# Patient Record
Sex: MALE | Race: WHITE | URBAN - METROPOLITAN AREA
[De-identification: names, ages, dates, MRNs, and addresses within clinical notes are randomized per-mention and may not be internally consistent; named-entity substitution may affect disease eponyms.]

---

## 2017-02-22 ENCOUNTER — HISTORICAL (OUTPATIENT)
Dept: ADMINISTRATIVE | Facility: HOSPITAL | Age: 77
End: 2017-02-22

## 2019-03-19 ENCOUNTER — HISTORICAL (OUTPATIENT)
Dept: RADIOLOGY | Facility: HOSPITAL | Age: 79
End: 2019-03-19

## 2021-10-05 ENCOUNTER — HISTORICAL (OUTPATIENT)
Dept: ADMINISTRATIVE | Facility: HOSPITAL | Age: 81
End: 2021-10-05

## 2021-10-05 LAB
ABS NEUT (OLG): 6.94 X10(3)/MCL (ref 2.1–9.2)
ALBUMIN SERPL-MCNC: 3.2 GM/DL (ref 3.4–4.8)
ALBUMIN/GLOB SERPL: 1.2 RATIO (ref 1.1–2)
ALP SERPL-CCNC: 81 UNIT/L (ref 40–150)
ALT SERPL-CCNC: 23 UNIT/L (ref 0–55)
AST SERPL-CCNC: 32 UNIT/L (ref 5–34)
BASOPHILS # BLD AUTO: 0.1 X10(3)/MCL (ref 0–0.2)
BASOPHILS NFR BLD AUTO: 1 %
BILIRUB SERPL-MCNC: 0.7 MG/DL
BILIRUBIN DIRECT+TOT PNL SERPL-MCNC: 0.2 MG/DL (ref 0–0.5)
BILIRUBIN DIRECT+TOT PNL SERPL-MCNC: 0.5 MG/DL (ref 0–0.8)
BUN SERPL-MCNC: 12.1 MG/DL (ref 8.4–25.7)
CALCIUM SERPL-MCNC: 9 MG/DL (ref 8.8–10)
CHLORIDE SERPL-SCNC: 103 MMOL/L (ref 98–107)
CHOLEST SERPL-MCNC: 184 MG/DL
CHOLEST/HDLC SERPL: 5 {RATIO} (ref 0–5)
CO2 SERPL-SCNC: 24 MMOL/L (ref 23–31)
CREAT SERPL-MCNC: 0.82 MG/DL (ref 0.73–1.18)
DIGOXIN SERPL-MCNC: 0.7 NG/ML (ref 0.8–2)
EOSINOPHIL # BLD AUTO: 0.1 X10(3)/MCL (ref 0–0.9)
EOSINOPHIL NFR BLD AUTO: 1 %
ERYTHROCYTE [DISTWIDTH] IN BLOOD BY AUTOMATED COUNT: 14.3 % (ref 11.5–17)
EST. AVERAGE GLUCOSE BLD GHB EST-MCNC: 99.7 MG/DL
GLOBULIN SER-MCNC: 2.7 GM/DL (ref 2.4–3.5)
GLUCOSE SERPL-MCNC: 98 MG/DL (ref 82–115)
HBA1C MFR BLD: 5.1 %
HCT VFR BLD AUTO: 39.8 % (ref 42–52)
HDLC SERPL-MCNC: 40 MG/DL (ref 35–60)
HGB BLD-MCNC: 13 GM/DL (ref 14–18)
LDLC SERPL CALC-MCNC: 126 MG/DL (ref 50–140)
LYMPHOCYTES # BLD AUTO: 1.4 X10(3)/MCL (ref 0.6–4.6)
LYMPHOCYTES NFR BLD AUTO: 15 %
MCH RBC QN AUTO: 31.7 PG (ref 27–31)
MCHC RBC AUTO-ENTMCNC: 32.7 GM/DL (ref 33–36)
MCV RBC AUTO: 97.1 FL (ref 80–94)
MONOCYTES # BLD AUTO: 1.1 X10(3)/MCL (ref 0.1–1.3)
MONOCYTES NFR BLD AUTO: 11 %
NEUTROPHILS # BLD AUTO: 6.94 X10(3)/MCL (ref 2.1–9.2)
NEUTROPHILS NFR BLD AUTO: 72 %
PLATELET # BLD AUTO: 354 X10(3)/MCL (ref 130–400)
PMV BLD AUTO: 10.2 FL (ref 9.4–12.4)
POTASSIUM SERPL-SCNC: 4.7 MMOL/L (ref 3.5–5.1)
PROT SERPL-MCNC: 5.9 GM/DL (ref 5.8–7.6)
PSA SERPL-MCNC: 6.45 NG/ML
RBC # BLD AUTO: 4.1 X10(6)/MCL (ref 4.7–6.1)
SODIUM SERPL-SCNC: 139 MMOL/L (ref 136–145)
TRIGL SERPL-MCNC: 89 MG/DL (ref 34–140)
TSH SERPL-ACNC: 3 UIU/ML (ref 0.35–4.94)
VLDLC SERPL CALC-MCNC: 18 MG/DL
WBC # SPEC AUTO: 9.7 X10(3)/MCL (ref 4.5–11.5)

## 2021-10-06 ENCOUNTER — HISTORICAL (OUTPATIENT)
Dept: ADMINISTRATIVE | Facility: HOSPITAL | Age: 81
End: 2021-10-06

## 2021-10-06 LAB — PREALB SERPL-MCNC: 14.9 MG/DL (ref 16–42)

## 2021-11-03 ENCOUNTER — HISTORICAL (OUTPATIENT)
Dept: ADMINISTRATIVE | Facility: HOSPITAL | Age: 81
End: 2021-11-03

## 2021-11-03 LAB
BUN SERPL-MCNC: 16.6 MG/DL (ref 8.4–25.7)
CALCIUM SERPL-MCNC: 8.4 MG/DL (ref 8.7–10.5)
CHLORIDE SERPL-SCNC: 109 MMOL/L (ref 98–107)
CO2 SERPL-SCNC: 28 MMOL/L (ref 23–31)
CREAT SERPL-MCNC: 0.78 MG/DL (ref 0.73–1.18)
CREAT/UREA NIT SERPL: 21
ERYTHROCYTE [DISTWIDTH] IN BLOOD BY AUTOMATED COUNT: 13.6 % (ref 11.5–17)
GLUCOSE SERPL-MCNC: 73 MG/DL (ref 82–115)
HCT VFR BLD AUTO: 34.8 % (ref 42–52)
HGB BLD-MCNC: 11.4 GM/DL (ref 14–18)
MCH RBC QN AUTO: 31 PG (ref 27–31)
MCHC RBC AUTO-ENTMCNC: 32.8 GM/DL (ref 33–36)
MCV RBC AUTO: 94.6 FL (ref 80–94)
PLATELET # BLD AUTO: 263 X10(3)/MCL (ref 130–400)
PMV BLD AUTO: 10.1 FL (ref 9.4–12.4)
POTASSIUM SERPL-SCNC: 4.5 MMOL/L (ref 3.5–5.1)
PREALB SERPL-MCNC: 13.3 MG/DL (ref 16–42)
RBC # BLD AUTO: 3.68 X10(6)/MCL (ref 4.7–6.1)
SODIUM SERPL-SCNC: 143 MMOL/L (ref 136–145)
WBC # SPEC AUTO: 7.3 X10(3)/MCL (ref 4.5–11.5)

## 2021-11-10 ENCOUNTER — HISTORICAL (OUTPATIENT)
Dept: CARDIOLOGY | Facility: HOSPITAL | Age: 81
End: 2021-11-10

## 2021-12-01 ENCOUNTER — HISTORICAL (OUTPATIENT)
Dept: ADMINISTRATIVE | Facility: HOSPITAL | Age: 81
End: 2021-12-01

## 2021-12-01 LAB — PREALB SERPL-MCNC: 12.3 MG/DL (ref 16–42)

## 2021-12-07 ENCOUNTER — HISTORICAL (OUTPATIENT)
Dept: ADMINISTRATIVE | Facility: HOSPITAL | Age: 81
End: 2021-12-07

## 2021-12-07 LAB
ERYTHROCYTE [DISTWIDTH] IN BLOOD BY AUTOMATED COUNT: 13.2 % (ref 11.5–17)
HCT VFR BLD AUTO: 37.9 % (ref 42–52)
HGB BLD-MCNC: 12.1 GM/DL (ref 14–18)
MCH RBC QN AUTO: 30.5 PG (ref 27–31)
MCHC RBC AUTO-ENTMCNC: 31.9 GM/DL (ref 33–36)
MCV RBC AUTO: 95.5 FL (ref 80–94)
PLATELET # BLD AUTO: 297 X10(3)/MCL (ref 130–400)
PMV BLD AUTO: 10.5 FL (ref 9.4–12.4)
RBC # BLD AUTO: 3.97 X10(6)/MCL (ref 4.7–6.1)
WBC # SPEC AUTO: 9.3 X10(3)/MCL (ref 4.5–11.5)

## 2021-12-28 ENCOUNTER — HISTORICAL (OUTPATIENT)
Dept: ADMINISTRATIVE | Facility: HOSPITAL | Age: 81
End: 2021-12-28

## 2021-12-28 LAB — DIGOXIN SERPL-MCNC: 0.4 NG/ML (ref 0.8–2)

## 2021-12-29 ENCOUNTER — HISTORICAL (OUTPATIENT)
Dept: ADMINISTRATIVE | Facility: HOSPITAL | Age: 81
End: 2021-12-29

## 2021-12-29 LAB — PREALB SERPL-MCNC: 14.1 MG/DL (ref 16–42)

## 2022-01-26 ENCOUNTER — HISTORICAL (OUTPATIENT)
Dept: ADMINISTRATIVE | Facility: HOSPITAL | Age: 82
End: 2022-01-26

## 2022-01-26 LAB — PREALB SERPL-MCNC: 17 MG/DL (ref 16–42)

## 2022-02-23 ENCOUNTER — HISTORICAL (OUTPATIENT)
Dept: ADMINISTRATIVE | Facility: HOSPITAL | Age: 82
End: 2022-02-23

## 2022-02-23 LAB — PREALB SERPL-MCNC: 19 (ref 16–42)

## 2022-03-07 ENCOUNTER — HISTORICAL (OUTPATIENT)
Dept: ADMINISTRATIVE | Facility: HOSPITAL | Age: 82
End: 2022-03-07

## 2022-03-07 LAB — PREALB SERPL-MCNC: 18.5 (ref 16–42)

## 2022-03-22 ENCOUNTER — HISTORICAL (OUTPATIENT)
Dept: ADMINISTRATIVE | Facility: HOSPITAL | Age: 82
End: 2022-03-22

## 2022-03-22 LAB — DIGOXIN SERPL-MCNC: 0.4 NG/ML (ref 0.8–2)

## 2022-04-04 ENCOUNTER — HISTORICAL (OUTPATIENT)
Dept: ADMINISTRATIVE | Facility: HOSPITAL | Age: 82
End: 2022-04-04

## 2022-04-04 LAB — PREALB SERPL-MCNC: 18.7 (ref 16–42)

## 2022-04-05 ENCOUNTER — HISTORICAL (OUTPATIENT)
Dept: ADMINISTRATIVE | Facility: HOSPITAL | Age: 82
End: 2022-04-05

## 2022-04-05 LAB
ABS NEUT (OLG): 6.86 (ref 2.1–9.2)
ALBUMIN SERPL-MCNC: 3.3 G/DL (ref 3.4–4.8)
ALBUMIN/GLOB SERPL: 1.3 {RATIO} (ref 1.1–2)
ALP SERPL-CCNC: 71 U/L (ref 40–150)
ALT SERPL-CCNC: 11 U/L (ref 0–55)
AST SERPL-CCNC: 13 U/L (ref 5–34)
BASOPHILS # BLD AUTO: 0 10*3/UL (ref 0–0.2)
BASOPHILS NFR BLD AUTO: 0 %
BILIRUB SERPL-MCNC: 0.6 MG/DL
BILIRUBIN DIRECT+TOT PNL SERPL-MCNC: 0.2 (ref 0–0.5)
BILIRUBIN DIRECT+TOT PNL SERPL-MCNC: 0.4 (ref 0–0.8)
BUN SERPL-MCNC: 17.9 MG/DL (ref 8.4–25.7)
CALCIUM SERPL-MCNC: 9.2 MG/DL (ref 8.7–10.5)
CHLORIDE SERPL-SCNC: 105 MMOL/L (ref 98–107)
CHOLEST SERPL-MCNC: 185 MG/DL
CHOLEST/HDLC SERPL: 6 {RATIO} (ref 0–5)
CO2 SERPL-SCNC: 29 MMOL/L (ref 23–31)
CREAT SERPL-MCNC: 0.91 MG/DL (ref 0.73–1.18)
EOSINOPHIL # BLD AUTO: 0.2 10*3/UL (ref 0–0.9)
EOSINOPHIL NFR BLD AUTO: 2 %
ERYTHROCYTE [DISTWIDTH] IN BLOOD BY AUTOMATED COUNT: 14.2 % (ref 11.5–17)
GLOBULIN SER-MCNC: 2.5 G/DL (ref 2.4–3.5)
GLUCOSE SERPL-MCNC: 82 MG/DL (ref 82–115)
HCT VFR BLD AUTO: 40 % (ref 42–52)
HDLC SERPL-MCNC: 32 MG/DL (ref 35–60)
HEMOLYSIS INTERF INDEX SERPL-ACNC: 3
HGB BLD-MCNC: 13.4 G/DL (ref 14–18)
ICTERIC INTERF INDEX SERPL-ACNC: 1
LDLC SERPL CALC-MCNC: 138 MG/DL (ref 50–140)
LIPEMIC INTERF INDEX SERPL-ACNC: 3
LYMPHOCYTES # BLD AUTO: 1.6 10*3/UL (ref 0.6–4.6)
LYMPHOCYTES NFR BLD AUTO: 17 %
MANUAL DIFF? (OHS): NO
MCH RBC QN AUTO: 31.3 PG (ref 27–31)
MCHC RBC AUTO-ENTMCNC: 33.5 G/DL (ref 33–36)
MCV RBC AUTO: 93.5 FL (ref 80–94)
MONOCYTES # BLD AUTO: 0.8 10*3/UL (ref 0.1–1.3)
MONOCYTES NFR BLD AUTO: 8 %
NEUTROPHILS # BLD AUTO: 6.86 10*3/UL (ref 2.1–9.2)
NEUTROPHILS NFR BLD AUTO: 72 %
PLATELET # BLD AUTO: 268 10*3/UL (ref 130–400)
PMV BLD AUTO: 10.5 FL (ref 9.4–12.4)
POTASSIUM SERPL-SCNC: 4.9 MMOL/L (ref 3.5–5.1)
PROT SERPL-MCNC: 5.8 G/DL (ref 5.8–7.6)
RBC # BLD AUTO: 4.28 10*6/UL (ref 4.7–6.1)
SODIUM SERPL-SCNC: 142 MMOL/L (ref 136–145)
TRIGL SERPL-MCNC: 77 MG/DL (ref 34–140)
VLDLC SERPL CALC-MCNC: 15 MG/DL
WBC # SPEC AUTO: 9.5 10*3/UL (ref 4.5–11.5)

## 2022-04-30 NOTE — OP NOTE
DATE OF SURGERY:    11/10/2021    SURGEON:  Geronimo Navarrete MD    PREOPERATIVE DIAGNOSIS:  Peripheral arterial disease with left foot wound.    POSTOPERATIVE DIAGNOSIS:  Peripheral arterial disease with left foot wound.    PROCEDURE PERFORMED:    1. Ultrasound guided access of right common femoral artery.  2. Aortogram with left lower extremity runoff.  3. Laser atherectomy and balloon angioplasty of left anterior tibial artery.    4. Laser atherectomy and balloon angioplasty of left peroneal artery.    5. Approximately 1 hour of monitored moderate conscious sedation.    ANESTHESIA:  Local with 1% lidocaine.    ESTIMATED BLOOD LOSS:  5 cc.    COMPLICATIONS:  None.    INDICATIONS FOR PROCEDURE:  The patient is an 81-year-old male who is a nursing home resident and poorly functional.  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  Operation indicated to optimize perfusion of possible.    PROCEDURE IN DETAIL:  After informed consent was obtained, patient was taken to the operating room, placed in supine position.  He was prepped and draped in a sterile fashion.  We used ultrasound to identify the common femoral artery on the patient's right hand side and these images were saved.  We accessed this with an 18 gauge needle followed by a wire via Seldinger technique and ultimately placed a 5-Danish sheath in the right groin and Omniflush catheter in the aorta.  We performed aortogram which revealed a widely patent aorta and widely patent bilateral iliac system.  We advanced the catheter to the distal external iliac artery on the left lower extremity and performed left lower extremity runoff.  The patient's common femoral artery and profunda femoral artery were widely patent with mild disease.  His superficial femoral artery was patent throughout its length as was his popliteal with scattered disease throughout and no flow-limiting stenosis.  The patient's  posterior tibial artery was occluded throughout its length and his runoff to the ankle was via a severely diseased peroneal and anterior tibial artery throughout their entire length.  We exchanged for a #5 Syriac up and over sheath and the patient was heparinized appropriately.  We were able to cross both the anterior tibial and peroneal artery lesions with a wire and prove true lumen with contrast injection.  We performed laser atherectomy through both of these vessels followed balloon angioplasty and had improvement of the stenotic lesions throughout with improved flow down to the level of the ankle.  There are no options in this patient for open revascularization and hopefully his improved perfusion today will help heal his current wounds and avoid limb loss.  At this point, the sheath was removed and pressure was held.  The patient tolerated the procedure well without any acute events or complications and was transferred in stable condition to recovery room.        ______________________________  MD ELIZABETH Mills III/UK  DD:  11/10/2021  Time:  11:23AM  DT:  11/10/2021  Time:  11:40AM  Job #:  [id number]

## 2022-09-06 ENCOUNTER — LAB REQUISITION (OUTPATIENT)
Dept: LAB | Facility: HOSPITAL | Age: 82
End: 2022-09-06
Payer: COMMERCIAL

## 2022-09-06 DIAGNOSIS — I48.91 UNSPECIFIED ATRIAL FIBRILLATION: ICD-10-CM

## 2022-09-06 LAB — DIGOXIN SERPL-MCNC: 0.4 NG/ML (ref 0.8–2)

## 2022-09-06 PROCEDURE — 80162 ASSAY OF DIGOXIN TOTAL: CPT | Performed by: THORACIC SURGERY (CARDIOTHORACIC VASCULAR SURGERY)

## 2022-09-27 ENCOUNTER — LAB REQUISITION (OUTPATIENT)
Dept: LAB | Facility: HOSPITAL | Age: 82
End: 2022-09-27
Payer: MEDICARE

## 2022-09-27 DIAGNOSIS — R97.20 ELEVATED PROSTATE SPECIFIC ANTIGEN (PSA): ICD-10-CM

## 2022-09-27 DIAGNOSIS — R94.6 ABNORMAL RESULTS OF THYROID FUNCTION STUDIES: ICD-10-CM

## 2022-09-27 DIAGNOSIS — R73.09 OTHER ABNORMAL GLUCOSE: ICD-10-CM

## 2022-09-27 LAB
EST. AVERAGE GLUCOSE BLD GHB EST-MCNC: 102.5 MG/DL
HBA1C MFR BLD: 5.2 %
PSA SERPL-MCNC: 8.26 NG/ML
TSH SERPL-ACNC: 2.5 UIU/ML (ref 0.35–4.94)

## 2022-09-27 PROCEDURE — 83036 HEMOGLOBIN GLYCOSYLATED A1C: CPT | Performed by: THORACIC SURGERY (CARDIOTHORACIC VASCULAR SURGERY)

## 2022-09-27 PROCEDURE — 84443 ASSAY THYROID STIM HORMONE: CPT | Performed by: THORACIC SURGERY (CARDIOTHORACIC VASCULAR SURGERY)

## 2022-09-27 PROCEDURE — 84153 ASSAY OF PSA TOTAL: CPT | Performed by: THORACIC SURGERY (CARDIOTHORACIC VASCULAR SURGERY)

## 2022-11-29 ENCOUNTER — LAB REQUISITION (OUTPATIENT)
Dept: LAB | Facility: HOSPITAL | Age: 82
End: 2022-11-29
Payer: MEDICARE

## 2022-11-29 DIAGNOSIS — I48.91 UNSPECIFIED ATRIAL FIBRILLATION: ICD-10-CM

## 2022-11-29 LAB — DIGOXIN SERPL-MCNC: 0.5 NG/ML (ref 0.8–2)

## 2022-11-29 PROCEDURE — 80162 ASSAY OF DIGOXIN TOTAL: CPT | Performed by: THORACIC SURGERY (CARDIOTHORACIC VASCULAR SURGERY)

## 2022-12-06 ENCOUNTER — LAB REQUISITION (OUTPATIENT)
Dept: LAB | Facility: HOSPITAL | Age: 82
End: 2022-12-06
Payer: MEDICARE

## 2022-12-06 DIAGNOSIS — R05.9 COUGH, UNSPECIFIED: ICD-10-CM

## 2022-12-06 LAB
ALBUMIN SERPL-MCNC: 2.8 GM/DL (ref 3.4–4.8)
ALBUMIN/GLOB SERPL: 1.3 RATIO (ref 1.1–2)
ALP SERPL-CCNC: 51 UNIT/L (ref 40–150)
ALT SERPL-CCNC: 19 UNIT/L (ref 0–55)
AST SERPL-CCNC: 25 UNIT/L (ref 5–34)
BASOPHILS # BLD AUTO: 0.01 X10(3)/MCL (ref 0–0.2)
BASOPHILS NFR BLD AUTO: 0.2 %
BILIRUBIN DIRECT+TOT PNL SERPL-MCNC: 0.7 MG/DL
BUN SERPL-MCNC: 15 MG/DL (ref 8.4–25.7)
CALCIUM SERPL-MCNC: 8.1 MG/DL (ref 8.8–10)
CHLORIDE SERPL-SCNC: 109 MMOL/L (ref 98–107)
CO2 SERPL-SCNC: 25 MMOL/L (ref 23–31)
CREAT SERPL-MCNC: 0.8 MG/DL (ref 0.73–1.18)
EOSINOPHIL # BLD AUTO: 0.14 X10(3)/MCL (ref 0–0.9)
EOSINOPHIL NFR BLD AUTO: 2.8 %
ERYTHROCYTE [DISTWIDTH] IN BLOOD BY AUTOMATED COUNT: 13.2 % (ref 11.5–17)
GFR SERPLBLD CREATININE-BSD FMLA CKD-EPI: >60 MLS/MIN/1.73/M2
GLOBULIN SER-MCNC: 2.2 GM/DL (ref 2.4–3.5)
GLUCOSE SERPL-MCNC: 74 MG/DL (ref 82–115)
HCT VFR BLD AUTO: 38.6 % (ref 42–52)
HGB BLD-MCNC: 13 GM/DL (ref 14–18)
IMM GRANULOCYTES # BLD AUTO: 0.02 X10(3)/MCL (ref 0–0.04)
IMM GRANULOCYTES NFR BLD AUTO: 0.4 %
LYMPHOCYTES # BLD AUTO: 1.02 X10(3)/MCL (ref 0.6–4.6)
LYMPHOCYTES NFR BLD AUTO: 20.3 %
MCH RBC QN AUTO: 31.9 PG (ref 27–31)
MCHC RBC AUTO-ENTMCNC: 33.7 MG/DL (ref 33–36)
MCV RBC AUTO: 94.6 FL (ref 80–94)
MONOCYTES # BLD AUTO: 0.62 X10(3)/MCL (ref 0.1–1.3)
MONOCYTES NFR BLD AUTO: 12.4 %
NEUTROPHILS # BLD AUTO: 3.2 X10(3)/MCL (ref 2.1–9.2)
NEUTROPHILS NFR BLD AUTO: 63.9 %
NRBC BLD AUTO-RTO: 0 %
PLATELET # BLD AUTO: 165 X10(3)/MCL (ref 130–400)
PMV BLD AUTO: 11.1 FL (ref 7.4–10.4)
POTASSIUM SERPL-SCNC: 4.4 MMOL/L (ref 3.5–5.1)
PROT SERPL-MCNC: 5 GM/DL (ref 5.8–7.6)
RBC # BLD AUTO: 4.08 X10(6)/MCL (ref 4.7–6.1)
SODIUM SERPL-SCNC: 143 MMOL/L (ref 136–145)
WBC # SPEC AUTO: 5 X10(3)/MCL (ref 4.5–11.5)

## 2022-12-06 PROCEDURE — 80053 COMPREHEN METABOLIC PANEL: CPT | Performed by: THORACIC SURGERY (CARDIOTHORACIC VASCULAR SURGERY)

## 2022-12-06 PROCEDURE — 85025 COMPLETE CBC W/AUTO DIFF WBC: CPT | Performed by: THORACIC SURGERY (CARDIOTHORACIC VASCULAR SURGERY)

## 2022-12-29 ENCOUNTER — LAB REQUISITION (OUTPATIENT)
Dept: LAB | Facility: HOSPITAL | Age: 82
End: 2022-12-29
Payer: MEDICARE

## 2022-12-29 DIAGNOSIS — R27.8 OTHER LACK OF COORDINATION: ICD-10-CM

## 2022-12-29 LAB — PSA SERPL-MCNC: 9.81 NG/ML

## 2022-12-29 PROCEDURE — 84153 ASSAY OF PSA TOTAL: CPT | Performed by: THORACIC SURGERY (CARDIOTHORACIC VASCULAR SURGERY)

## 2023-04-04 ENCOUNTER — LAB REQUISITION (OUTPATIENT)
Dept: LAB | Facility: HOSPITAL | Age: 83
End: 2023-04-04
Payer: MEDICARE

## 2023-04-04 DIAGNOSIS — R79.89 OTHER SPECIFIED ABNORMAL FINDINGS OF BLOOD CHEMISTRY: ICD-10-CM

## 2023-04-04 DIAGNOSIS — R68.89 OTHER GENERAL SYMPTOMS AND SIGNS: ICD-10-CM

## 2023-04-04 DIAGNOSIS — Z13.220 ENCOUNTER FOR SCREENING FOR LIPOID DISORDERS: ICD-10-CM

## 2023-04-04 LAB
ALBUMIN SERPL-MCNC: 3.1 G/DL (ref 3.4–4.8)
ALBUMIN/GLOB SERPL: 0.8 RATIO (ref 1.1–2)
ALP SERPL-CCNC: 62 UNIT/L (ref 40–150)
ALT SERPL-CCNC: 11 UNIT/L (ref 0–55)
AST SERPL-CCNC: 13 UNIT/L (ref 5–34)
BASOPHILS # BLD AUTO: 0.01 X10(3)/MCL (ref 0–0.2)
BASOPHILS NFR BLD AUTO: 0.4 %
BILIRUBIN DIRECT+TOT PNL SERPL-MCNC: 0.1 MG/DL
BUN SERPL-MCNC: 27.9 MG/DL (ref 8.4–25.7)
CALCIUM SERPL-MCNC: 9.3 MG/DL (ref 8.8–10)
CHLORIDE SERPL-SCNC: 109 MMOL/L (ref 98–107)
CHOLEST SERPL-MCNC: 201 MG/DL
CHOLEST/HDLC SERPL: 3 {RATIO} (ref 0–5)
CO2 SERPL-SCNC: 22 MMOL/L (ref 23–31)
CREAT SERPL-MCNC: 1.12 MG/DL (ref 0.73–1.18)
EOSINOPHIL # BLD AUTO: 0.05 X10(3)/MCL (ref 0–0.9)
EOSINOPHIL NFR BLD AUTO: 1.9 %
ERYTHROCYTE [DISTWIDTH] IN BLOOD BY AUTOMATED COUNT: 15.5 % (ref 11.5–17)
GFR SERPLBLD CREATININE-BSD FMLA CKD-EPI: >60 MLS/MIN/1.73/M2
GLOBULIN SER-MCNC: 3.8 GM/DL (ref 2.4–3.5)
GLUCOSE SERPL-MCNC: 81 MG/DL (ref 82–115)
HCT VFR BLD AUTO: 23.9 % (ref 42–52)
HDLC SERPL-MCNC: 58 MG/DL (ref 35–60)
HGB BLD-MCNC: 7.9 G/DL (ref 14–18)
IMM GRANULOCYTES # BLD AUTO: 0.01 X10(3)/MCL (ref 0–0.04)
IMM GRANULOCYTES NFR BLD AUTO: 0.4 %
LDLC SERPL CALC-MCNC: 129 MG/DL (ref 50–140)
LYMPHOCYTES # BLD AUTO: 1.22 X10(3)/MCL (ref 0.6–4.6)
LYMPHOCYTES NFR BLD AUTO: 45.4 %
MCH RBC QN AUTO: 27.1 PG (ref 27–31)
MCHC RBC AUTO-ENTMCNC: 33.1 G/DL (ref 33–36)
MCV RBC AUTO: 82.1 FL (ref 80–94)
MONOCYTES # BLD AUTO: 0.21 X10(3)/MCL (ref 0.1–1.3)
MONOCYTES NFR BLD AUTO: 7.8 %
NEUTROPHILS # BLD AUTO: 1.19 X10(3)/MCL (ref 2.1–9.2)
NEUTROPHILS NFR BLD AUTO: 44.1 %
NRBC BLD AUTO-RTO: 0 %
PLATELET # BLD AUTO: 151 X10(3)/MCL (ref 130–400)
PMV BLD AUTO: 10.7 FL (ref 7.4–10.4)
POTASSIUM SERPL-SCNC: 4.3 MMOL/L (ref 3.5–5.1)
PROT SERPL-MCNC: 6.9 GM/DL (ref 5.8–7.6)
RBC # BLD AUTO: 2.91 X10(6)/MCL (ref 4.7–6.1)
SODIUM SERPL-SCNC: 138 MMOL/L (ref 136–145)
TRIGL SERPL-MCNC: 68 MG/DL (ref 34–140)
VLDLC SERPL CALC-MCNC: 14 MG/DL
WBC # SPEC AUTO: 2.7 X10(3)/MCL (ref 4.5–11.5)

## 2023-04-04 PROCEDURE — 80053 COMPREHEN METABOLIC PANEL: CPT | Performed by: FAMILY MEDICINE

## 2023-04-04 PROCEDURE — 85025 COMPLETE CBC W/AUTO DIFF WBC: CPT | Performed by: FAMILY MEDICINE

## 2023-04-04 PROCEDURE — 80061 LIPID PANEL: CPT | Performed by: FAMILY MEDICINE

## 2023-05-16 ENCOUNTER — LAB REQUISITION (OUTPATIENT)
Dept: LAB | Facility: HOSPITAL | Age: 83
End: 2023-05-16
Payer: MEDICARE

## 2023-05-16 DIAGNOSIS — Z51.81 ENCOUNTER FOR THERAPEUTIC DRUG LEVEL MONITORING: ICD-10-CM

## 2023-05-16 LAB — DIGOXIN SERPL-MCNC: 1 NG/ML (ref 0.8–2)

## 2023-05-16 PROCEDURE — 80162 ASSAY OF DIGOXIN TOTAL: CPT | Performed by: FAMILY MEDICINE

## 2023-06-28 ENCOUNTER — LAB REQUISITION (OUTPATIENT)
Dept: LAB | Facility: HOSPITAL | Age: 83
End: 2023-06-28
Payer: MEDICARE

## 2023-06-28 DIAGNOSIS — R79.89 OTHER SPECIFIED ABNORMAL FINDINGS OF BLOOD CHEMISTRY: ICD-10-CM

## 2023-06-28 DIAGNOSIS — R68.89 OTHER GENERAL SYMPTOMS AND SIGNS: ICD-10-CM

## 2023-06-28 LAB
ALBUMIN SERPL-MCNC: 3.2 G/DL (ref 3.4–4.8)
ALBUMIN/GLOB SERPL: 1.5 RATIO (ref 1.1–2)
ALP SERPL-CCNC: 51 UNIT/L (ref 40–150)
ALT SERPL-CCNC: 8 UNIT/L (ref 0–55)
AST SERPL-CCNC: 12 UNIT/L (ref 5–34)
BILIRUBIN DIRECT+TOT PNL SERPL-MCNC: 0.6 MG/DL
BUN SERPL-MCNC: 18.1 MG/DL (ref 8.4–25.7)
CALCIUM SERPL-MCNC: 8.9 MG/DL (ref 8.8–10)
CHLORIDE SERPL-SCNC: 110 MMOL/L (ref 98–107)
CHOLEST SERPL-MCNC: 182 MG/DL
CHOLEST/HDLC SERPL: 6 {RATIO} (ref 0–5)
CO2 SERPL-SCNC: 28 MMOL/L (ref 23–31)
CREAT SERPL-MCNC: 0.9 MG/DL (ref 0.73–1.18)
ERYTHROCYTE [DISTWIDTH] IN BLOOD BY AUTOMATED COUNT: 13.9 % (ref 11.5–17)
GFR SERPLBLD CREATININE-BSD FMLA CKD-EPI: >60 MLS/MIN/1.73/M2
GLOBULIN SER-MCNC: 2.1 GM/DL (ref 2.4–3.5)
GLUCOSE SERPL-MCNC: 77 MG/DL (ref 82–115)
HCT VFR BLD AUTO: 40.4 % (ref 42–52)
HDLC SERPL-MCNC: 33 MG/DL (ref 35–60)
HGB BLD-MCNC: 13.4 G/DL (ref 14–18)
LDLC SERPL CALC-MCNC: 137 MG/DL (ref 50–140)
MCH RBC QN AUTO: 31.2 PG (ref 27–31)
MCHC RBC AUTO-ENTMCNC: 33.2 G/DL (ref 33–36)
MCV RBC AUTO: 94.2 FL (ref 80–94)
NRBC BLD AUTO-RTO: 0 %
PLATELET # BLD AUTO: 222 X10(3)/MCL (ref 130–400)
PMV BLD AUTO: 10.9 FL (ref 7.4–10.4)
POTASSIUM SERPL-SCNC: 4.9 MMOL/L (ref 3.5–5.1)
PROT SERPL-MCNC: 5.3 GM/DL (ref 5.8–7.6)
RBC # BLD AUTO: 4.29 X10(6)/MCL (ref 4.7–6.1)
SODIUM SERPL-SCNC: 146 MMOL/L (ref 136–145)
TRIGL SERPL-MCNC: 58 MG/DL (ref 34–140)
VLDLC SERPL CALC-MCNC: 12 MG/DL
WBC # SPEC AUTO: 8.46 X10(3)/MCL (ref 4.5–11.5)

## 2023-06-28 PROCEDURE — 80053 COMPREHEN METABOLIC PANEL: CPT | Performed by: FAMILY MEDICINE

## 2023-06-28 PROCEDURE — 85027 COMPLETE CBC AUTOMATED: CPT | Performed by: FAMILY MEDICINE

## 2023-06-28 PROCEDURE — 80061 LIPID PANEL: CPT | Performed by: FAMILY MEDICINE

## 2023-08-08 ENCOUNTER — LAB REQUISITION (OUTPATIENT)
Dept: LAB | Facility: HOSPITAL | Age: 83
End: 2023-08-08
Payer: MEDICARE

## 2023-08-08 DIAGNOSIS — I48.91 UNSPECIFIED ATRIAL FIBRILLATION: ICD-10-CM

## 2023-08-08 LAB — DIGOXIN SERPL-MCNC: 1.2 NG/ML (ref 0.8–2)

## 2023-08-08 PROCEDURE — 80162 ASSAY OF DIGOXIN TOTAL: CPT | Performed by: FAMILY MEDICINE

## 2023-09-19 ENCOUNTER — LAB REQUISITION (OUTPATIENT)
Dept: LAB | Facility: HOSPITAL | Age: 83
End: 2023-09-19
Payer: MEDICARE

## 2023-09-19 DIAGNOSIS — I48.91 UNSPECIFIED ATRIAL FIBRILLATION: ICD-10-CM

## 2023-09-19 DIAGNOSIS — I50.20 UNSPECIFIED SYSTOLIC (CONGESTIVE) HEART FAILURE: ICD-10-CM

## 2023-09-19 LAB
EST. AVERAGE GLUCOSE BLD GHB EST-MCNC: 102.5 MG/DL
HBA1C MFR BLD: 5.2 %
PSA SERPL-MCNC: 12.43 NG/ML
TSH SERPL-ACNC: 1.68 UIU/ML (ref 0.35–4.94)

## 2023-09-19 PROCEDURE — 84153 ASSAY OF PSA TOTAL: CPT | Performed by: FAMILY MEDICINE

## 2023-09-19 PROCEDURE — 84443 ASSAY THYROID STIM HORMONE: CPT | Performed by: FAMILY MEDICINE

## 2023-09-19 PROCEDURE — 83036 HEMOGLOBIN GLYCOSYLATED A1C: CPT | Performed by: FAMILY MEDICINE

## 2023-10-03 ENCOUNTER — LAB REQUISITION (OUTPATIENT)
Dept: LAB | Facility: HOSPITAL | Age: 83
End: 2023-10-03
Payer: MEDICARE

## 2023-10-03 DIAGNOSIS — Z13.220 ENCOUNTER FOR SCREENING FOR LIPOID DISORDERS: ICD-10-CM

## 2023-10-03 DIAGNOSIS — R79.89 OTHER SPECIFIED ABNORMAL FINDINGS OF BLOOD CHEMISTRY: ICD-10-CM

## 2023-10-03 LAB
ALBUMIN SERPL-MCNC: 2.8 G/DL (ref 3.4–4.8)
ALBUMIN/GLOB SERPL: 1.6 RATIO (ref 1.1–2)
ALP SERPL-CCNC: 45 UNIT/L (ref 40–150)
ALT SERPL-CCNC: 8 UNIT/L (ref 0–55)
AST SERPL-CCNC: 10 UNIT/L (ref 5–34)
BASOPHILS # BLD AUTO: 0.04 X10(3)/MCL
BASOPHILS NFR BLD AUTO: 0.5 %
BILIRUB SERPL-MCNC: 0.8 MG/DL
BUN SERPL-MCNC: 14.7 MG/DL (ref 8.4–25.7)
CALCIUM SERPL-MCNC: 8.1 MG/DL (ref 8.8–10)
CHLORIDE SERPL-SCNC: 112 MMOL/L (ref 98–107)
CHOLEST SERPL-MCNC: 147 MG/DL
CHOLEST/HDLC SERPL: 5 {RATIO} (ref 0–5)
CO2 SERPL-SCNC: 26 MMOL/L (ref 23–31)
CREAT SERPL-MCNC: 0.93 MG/DL (ref 0.73–1.18)
EOSINOPHIL # BLD AUTO: 0.16 X10(3)/MCL (ref 0–0.9)
EOSINOPHIL NFR BLD AUTO: 2.1 %
ERYTHROCYTE [DISTWIDTH] IN BLOOD BY AUTOMATED COUNT: 13.2 % (ref 11.5–17)
GFR SERPLBLD CREATININE-BSD FMLA CKD-EPI: >60 MLS/MIN/1.73/M2
GLOBULIN SER-MCNC: 1.8 GM/DL (ref 2.4–3.5)
GLUCOSE SERPL-MCNC: 73 MG/DL (ref 82–115)
HCT VFR BLD AUTO: 38 % (ref 42–52)
HDLC SERPL-MCNC: 29 MG/DL (ref 35–60)
HGB BLD-MCNC: 12.6 G/DL (ref 14–18)
IMM GRANULOCYTES # BLD AUTO: 0.03 X10(3)/MCL (ref 0–0.04)
IMM GRANULOCYTES NFR BLD AUTO: 0.4 %
LDLC SERPL CALC-MCNC: 106 MG/DL (ref 50–140)
LYMPHOCYTES # BLD AUTO: 1.96 X10(3)/MCL (ref 0.6–4.6)
LYMPHOCYTES NFR BLD AUTO: 25.5 %
MCH RBC QN AUTO: 31.5 PG (ref 27–31)
MCHC RBC AUTO-ENTMCNC: 33.2 G/DL (ref 33–36)
MCV RBC AUTO: 95 FL (ref 80–94)
MONOCYTES # BLD AUTO: 0.78 X10(3)/MCL (ref 0.1–1.3)
MONOCYTES NFR BLD AUTO: 10.1 %
NEUTROPHILS # BLD AUTO: 4.73 X10(3)/MCL (ref 2.1–9.2)
NEUTROPHILS NFR BLD AUTO: 61.4 %
NRBC BLD AUTO-RTO: 0 %
PLATELET # BLD AUTO: 192 X10(3)/MCL (ref 130–400)
PMV BLD AUTO: 11 FL (ref 7.4–10.4)
POTASSIUM SERPL-SCNC: 4.1 MMOL/L (ref 3.5–5.1)
PROT SERPL-MCNC: 4.6 GM/DL (ref 5.8–7.6)
RBC # BLD AUTO: 4 X10(6)/MCL (ref 4.7–6.1)
SODIUM SERPL-SCNC: 146 MMOL/L (ref 136–145)
TRIGL SERPL-MCNC: 58 MG/DL (ref 34–140)
VLDLC SERPL CALC-MCNC: 12 MG/DL
WBC # SPEC AUTO: 7.7 X10(3)/MCL (ref 4.5–11.5)

## 2023-10-03 PROCEDURE — 80061 LIPID PANEL: CPT | Performed by: FAMILY MEDICINE

## 2023-10-03 PROCEDURE — 85025 COMPLETE CBC W/AUTO DIFF WBC: CPT | Performed by: FAMILY MEDICINE

## 2023-10-03 PROCEDURE — 80053 COMPREHEN METABOLIC PANEL: CPT | Performed by: FAMILY MEDICINE

## 2023-10-31 ENCOUNTER — LAB REQUISITION (OUTPATIENT)
Dept: LAB | Facility: HOSPITAL | Age: 83
End: 2023-10-31
Payer: MEDICARE

## 2023-10-31 DIAGNOSIS — I73.9 PERIPHERAL VASCULAR DISEASE, UNSPECIFIED: ICD-10-CM

## 2023-10-31 LAB — DIGOXIN SERPL-MCNC: 1.2 NG/ML (ref 0.8–2)

## 2023-10-31 PROCEDURE — 80162 ASSAY OF DIGOXIN TOTAL: CPT | Performed by: FAMILY MEDICINE

## 2023-11-09 ENCOUNTER — LAB REQUISITION (OUTPATIENT)
Dept: LAB | Facility: HOSPITAL | Age: 83
End: 2023-11-09
Payer: COMMERCIAL

## 2023-11-09 DIAGNOSIS — R79.9 ABNORMAL FINDING OF BLOOD CHEMISTRY, UNSPECIFIED: ICD-10-CM

## 2023-11-09 LAB
ERYTHROCYTE [DISTWIDTH] IN BLOOD BY AUTOMATED COUNT: 13.3 % (ref 11.5–17)
HCT VFR BLD AUTO: 39 % (ref 42–52)
HGB BLD-MCNC: 12.7 G/DL (ref 14–18)
MCH RBC QN AUTO: 30.9 PG (ref 27–31)
MCHC RBC AUTO-ENTMCNC: 32.6 G/DL (ref 33–36)
MCV RBC AUTO: 94.9 FL (ref 80–94)
NRBC BLD AUTO-RTO: 0 %
PLATELET # BLD AUTO: 197 X10(3)/MCL (ref 130–400)
PMV BLD AUTO: 10.7 FL (ref 7.4–10.4)
RBC # BLD AUTO: 4.11 X10(6)/MCL (ref 4.7–6.1)
WBC # SPEC AUTO: 8.59 X10(3)/MCL (ref 4.5–11.5)

## 2023-11-09 PROCEDURE — 85027 COMPLETE CBC AUTOMATED: CPT | Performed by: FAMILY MEDICINE

## 2024-01-11 ENCOUNTER — LAB REQUISITION (OUTPATIENT)
Dept: LAB | Facility: HOSPITAL | Age: 84
End: 2024-01-11
Payer: MEDICARE

## 2024-01-11 DIAGNOSIS — R63.4 ABNORMAL WEIGHT LOSS: ICD-10-CM

## 2024-01-11 LAB
ALBUMIN SERPL-MCNC: 3.2 G/DL (ref 3.4–4.8)
ALBUMIN/GLOB SERPL: 1.7 RATIO (ref 1.1–2)
ALP SERPL-CCNC: 54 UNIT/L (ref 40–150)
ALT SERPL-CCNC: 10 UNIT/L (ref 0–55)
AST SERPL-CCNC: 13 UNIT/L (ref 5–34)
BILIRUB SERPL-MCNC: 0.8 MG/DL
BUN SERPL-MCNC: 13 MG/DL (ref 8.4–25.7)
CALCIUM SERPL-MCNC: 8.8 MG/DL (ref 8.8–10)
CHLORIDE SERPL-SCNC: 107 MMOL/L (ref 98–107)
CO2 SERPL-SCNC: 30 MMOL/L (ref 23–31)
CREAT SERPL-MCNC: 0.96 MG/DL (ref 0.73–1.18)
ERYTHROCYTE [DISTWIDTH] IN BLOOD BY AUTOMATED COUNT: 13.7 % (ref 11.5–17)
GFR SERPLBLD CREATININE-BSD FMLA CKD-EPI: >60 MLS/MIN/1.73/M2
GLOBULIN SER-MCNC: 1.9 GM/DL (ref 2.4–3.5)
GLUCOSE SERPL-MCNC: 72 MG/DL (ref 82–115)
HCT VFR BLD AUTO: 38.6 % (ref 42–52)
HGB BLD-MCNC: 12.8 G/DL (ref 14–18)
MCH RBC QN AUTO: 31.5 PG (ref 27–31)
MCHC RBC AUTO-ENTMCNC: 33.2 G/DL (ref 33–36)
MCV RBC AUTO: 95.1 FL (ref 80–94)
NRBC BLD AUTO-RTO: 0 %
PLATELET # BLD AUTO: 207 X10(3)/MCL (ref 130–400)
PMV BLD AUTO: 10.6 FL (ref 7.4–10.4)
POTASSIUM SERPL-SCNC: 4.4 MMOL/L (ref 3.5–5.1)
PROT SERPL-MCNC: 5.1 GM/DL (ref 5.8–7.6)
RBC # BLD AUTO: 4.06 X10(6)/MCL (ref 4.7–6.1)
SODIUM SERPL-SCNC: 143 MMOL/L (ref 136–145)
WBC # SPEC AUTO: 7.53 X10(3)/MCL (ref 4.5–11.5)

## 2024-01-11 PROCEDURE — 80053 COMPREHEN METABOLIC PANEL: CPT | Performed by: FAMILY MEDICINE

## 2024-01-11 PROCEDURE — 85027 COMPLETE CBC AUTOMATED: CPT | Performed by: FAMILY MEDICINE

## 2024-01-23 ENCOUNTER — LAB REQUISITION (OUTPATIENT)
Dept: LAB | Facility: HOSPITAL | Age: 84
End: 2024-01-23
Payer: MEDICARE

## 2024-01-23 DIAGNOSIS — I48.91 UNSPECIFIED ATRIAL FIBRILLATION: ICD-10-CM

## 2024-01-23 LAB — DIGOXIN SERPL-MCNC: 0.9 NG/ML (ref 0.8–2)

## 2024-01-23 PROCEDURE — 80162 ASSAY OF DIGOXIN TOTAL: CPT | Performed by: FAMILY MEDICINE

## 2024-03-28 ENCOUNTER — LAB REQUISITION (OUTPATIENT)
Dept: LAB | Facility: HOSPITAL | Age: 84
End: 2024-03-28
Payer: MEDICARE

## 2024-03-28 DIAGNOSIS — I10 ESSENTIAL (PRIMARY) HYPERTENSION: ICD-10-CM

## 2024-03-28 LAB
ALBUMIN SERPL-MCNC: 2.9 G/DL (ref 3.4–4.8)
ALBUMIN/GLOB SERPL: 1.2 RATIO (ref 1.1–2)
ALP SERPL-CCNC: 45 UNIT/L (ref 40–150)
ALT SERPL-CCNC: 16 UNIT/L (ref 0–55)
AST SERPL-CCNC: 27 UNIT/L (ref 5–34)
BILIRUB SERPL-MCNC: 1 MG/DL
BNP BLD-MCNC: 255.3 PG/ML
BUN SERPL-MCNC: 17.1 MG/DL (ref 8.4–25.7)
CALCIUM SERPL-MCNC: 8.4 MG/DL (ref 8.8–10)
CHLORIDE SERPL-SCNC: 111 MMOL/L (ref 98–107)
CO2 SERPL-SCNC: 23 MMOL/L (ref 23–31)
CREAT SERPL-MCNC: 0.86 MG/DL (ref 0.73–1.18)
ERYTHROCYTE [DISTWIDTH] IN BLOOD BY AUTOMATED COUNT: 13.2 % (ref 11.5–17)
GFR SERPLBLD CREATININE-BSD FMLA CKD-EPI: >60 MLS/MIN/1.73/M2
GLOBULIN SER-MCNC: 2.4 GM/DL (ref 2.4–3.5)
GLUCOSE SERPL-MCNC: 80 MG/DL (ref 82–115)
HCT VFR BLD AUTO: 39.5 % (ref 42–52)
HGB BLD-MCNC: 13.2 G/DL (ref 14–18)
MCH RBC QN AUTO: 31.3 PG (ref 27–31)
MCHC RBC AUTO-ENTMCNC: 33.4 G/DL (ref 33–36)
MCV RBC AUTO: 93.6 FL (ref 80–94)
NRBC BLD AUTO-RTO: 0 %
PLATELET # BLD AUTO: 231 X10(3)/MCL (ref 130–400)
PMV BLD AUTO: 10.7 FL (ref 7.4–10.4)
POTASSIUM SERPL-SCNC: 4.4 MMOL/L (ref 3.5–5.1)
PROT SERPL-MCNC: 5.3 GM/DL (ref 5.8–7.6)
RBC # BLD AUTO: 4.22 X10(6)/MCL (ref 4.7–6.1)
SODIUM SERPL-SCNC: 143 MMOL/L (ref 136–145)
WBC # SPEC AUTO: 11.59 X10(3)/MCL (ref 4.5–11.5)

## 2024-03-28 PROCEDURE — 85027 COMPLETE CBC AUTOMATED: CPT | Performed by: FAMILY MEDICINE

## 2024-03-28 PROCEDURE — 83880 ASSAY OF NATRIURETIC PEPTIDE: CPT | Performed by: FAMILY MEDICINE

## 2024-03-28 PROCEDURE — 80053 COMPREHEN METABOLIC PANEL: CPT | Performed by: FAMILY MEDICINE

## 2024-03-29 ENCOUNTER — LAB REQUISITION (OUTPATIENT)
Dept: LAB | Facility: HOSPITAL | Age: 84
End: 2024-03-29
Payer: MEDICARE

## 2024-03-29 DIAGNOSIS — D72.829 ELEVATED WHITE BLOOD CELL COUNT, UNSPECIFIED: ICD-10-CM

## 2024-03-29 LAB
APPEARANCE UR: ABNORMAL
BACTERIA #/AREA URNS AUTO: ABNORMAL /HPF
BILIRUB UR QL STRIP.AUTO: NEGATIVE
COLOR UR AUTO: ABNORMAL
GLUCOSE UR QL STRIP.AUTO: NEGATIVE
KETONES UR QL STRIP.AUTO: NEGATIVE
LEUKOCYTE ESTERASE UR QL STRIP.AUTO: ABNORMAL
NITRITE UR QL STRIP.AUTO: NEGATIVE
PH UR STRIP.AUTO: 7.5 [PH]
PROT UR QL STRIP.AUTO: NEGATIVE
RBC #/AREA URNS AUTO: ABNORMAL /HPF
RBC UR QL AUTO: NEGATIVE
SP GR UR STRIP.AUTO: 1.01 (ref 1–1.03)
SQUAMOUS #/AREA URNS AUTO: ABNORMAL /HPF
TRI-PHOS CRY URNS QL MICRO: ABNORMAL /HPF
UROBILINOGEN UR STRIP-ACNC: 2
WBC #/AREA URNS AUTO: ABNORMAL /HPF

## 2024-03-29 PROCEDURE — 81001 URINALYSIS AUTO W/SCOPE: CPT | Performed by: FAMILY MEDICINE

## 2024-03-29 PROCEDURE — 87086 URINE CULTURE/COLONY COUNT: CPT | Performed by: FAMILY MEDICINE

## 2024-03-31 LAB — BACTERIA UR CULT: ABNORMAL

## 2024-04-02 ENCOUNTER — LAB REQUISITION (OUTPATIENT)
Dept: LAB | Facility: HOSPITAL | Age: 84
End: 2024-04-02
Payer: MEDICARE

## 2024-04-02 DIAGNOSIS — R79.9 ABNORMAL FINDING OF BLOOD CHEMISTRY, UNSPECIFIED: ICD-10-CM

## 2024-04-02 LAB
ALBUMIN SERPL-MCNC: 3.1 G/DL (ref 3.4–4.8)
ALBUMIN/GLOB SERPL: 1.2 RATIO (ref 1.1–2)
ALP SERPL-CCNC: 50 UNIT/L (ref 40–150)
ALT SERPL-CCNC: 14 UNIT/L (ref 0–55)
AST SERPL-CCNC: 14 UNIT/L (ref 5–34)
BASOPHILS # BLD AUTO: 0.06 X10(3)/MCL
BASOPHILS NFR BLD AUTO: 0.6 %
BILIRUB SERPL-MCNC: 0.8 MG/DL
BUN SERPL-MCNC: 17.2 MG/DL (ref 8.4–25.7)
CALCIUM SERPL-MCNC: 8.8 MG/DL (ref 8.8–10)
CHLORIDE SERPL-SCNC: 110 MMOL/L (ref 98–107)
CHOLEST SERPL-MCNC: 146 MG/DL
CHOLEST/HDLC SERPL: 6 {RATIO} (ref 0–5)
CO2 SERPL-SCNC: 25 MMOL/L (ref 23–31)
CREAT SERPL-MCNC: 1.02 MG/DL (ref 0.73–1.18)
EOSINOPHIL # BLD AUTO: 0.21 X10(3)/MCL (ref 0–0.9)
EOSINOPHIL NFR BLD AUTO: 1.9 %
ERYTHROCYTE [DISTWIDTH] IN BLOOD BY AUTOMATED COUNT: 13.2 % (ref 11.5–17)
GFR SERPLBLD CREATININE-BSD FMLA CKD-EPI: >60 MLS/MIN/1.73/M2
GLOBULIN SER-MCNC: 2.5 GM/DL (ref 2.4–3.5)
GLUCOSE SERPL-MCNC: 82 MG/DL (ref 82–115)
HCT VFR BLD AUTO: 43.1 % (ref 42–52)
HDLC SERPL-MCNC: 24 MG/DL (ref 35–60)
HGB BLD-MCNC: 14.1 G/DL (ref 14–18)
IMM GRANULOCYTES # BLD AUTO: 0.07 X10(3)/MCL (ref 0–0.04)
IMM GRANULOCYTES NFR BLD AUTO: 0.6 %
LDLC SERPL CALC-MCNC: 109 MG/DL (ref 50–140)
LYMPHOCYTES # BLD AUTO: 2.36 X10(3)/MCL (ref 0.6–4.6)
LYMPHOCYTES NFR BLD AUTO: 21.7 %
MCH RBC QN AUTO: 31.5 PG (ref 27–31)
MCHC RBC AUTO-ENTMCNC: 32.7 G/DL (ref 33–36)
MCV RBC AUTO: 96.4 FL (ref 80–94)
MONOCYTES # BLD AUTO: 1.01 X10(3)/MCL (ref 0.1–1.3)
MONOCYTES NFR BLD AUTO: 9.3 %
NEUTROPHILS # BLD AUTO: 7.18 X10(3)/MCL (ref 2.1–9.2)
NEUTROPHILS NFR BLD AUTO: 65.9 %
NRBC BLD AUTO-RTO: 0 %
PLATELET # BLD AUTO: 256 X10(3)/MCL (ref 130–400)
PMV BLD AUTO: 11.1 FL (ref 7.4–10.4)
POTASSIUM SERPL-SCNC: 5.3 MMOL/L (ref 3.5–5.1)
PROT SERPL-MCNC: 5.6 GM/DL (ref 5.8–7.6)
RBC # BLD AUTO: 4.47 X10(6)/MCL (ref 4.7–6.1)
SODIUM SERPL-SCNC: 145 MMOL/L (ref 136–145)
TRIGL SERPL-MCNC: 65 MG/DL (ref 34–140)
VLDLC SERPL CALC-MCNC: 13 MG/DL
WBC # SPEC AUTO: 10.89 X10(3)/MCL (ref 4.5–11.5)

## 2024-04-02 PROCEDURE — 80061 LIPID PANEL: CPT | Performed by: FAMILY MEDICINE

## 2024-04-02 PROCEDURE — 80053 COMPREHEN METABOLIC PANEL: CPT | Performed by: FAMILY MEDICINE

## 2024-04-02 PROCEDURE — 85025 COMPLETE CBC W/AUTO DIFF WBC: CPT | Performed by: FAMILY MEDICINE

## 2024-04-04 ENCOUNTER — LAB REQUISITION (OUTPATIENT)
Dept: LAB | Facility: HOSPITAL | Age: 84
End: 2024-04-04
Payer: COMMERCIAL

## 2024-04-04 DIAGNOSIS — N39.0 URINARY TRACT INFECTION, SITE NOT SPECIFIED: ICD-10-CM

## 2024-04-04 LAB
BASOPHILS # BLD AUTO: 0.11 X10(3)/MCL
BASOPHILS NFR BLD AUTO: 0.9 %
EOSINOPHIL # BLD AUTO: 0.29 X10(3)/MCL (ref 0–0.9)
EOSINOPHIL NFR BLD AUTO: 2.5 %
ERYTHROCYTE [DISTWIDTH] IN BLOOD BY AUTOMATED COUNT: 13 % (ref 11.5–17)
HCT VFR BLD AUTO: 45.6 % (ref 42–52)
HGB BLD-MCNC: 14.8 G/DL (ref 14–18)
IMM GRANULOCYTES # BLD AUTO: 0.12 X10(3)/MCL (ref 0–0.04)
IMM GRANULOCYTES NFR BLD AUTO: 1 %
LYMPHOCYTES # BLD AUTO: 2.66 X10(3)/MCL (ref 0.6–4.6)
LYMPHOCYTES NFR BLD AUTO: 22.7 %
MCH RBC QN AUTO: 30.8 PG (ref 27–31)
MCHC RBC AUTO-ENTMCNC: 32.5 G/DL (ref 33–36)
MCV RBC AUTO: 94.8 FL (ref 80–94)
MONOCYTES # BLD AUTO: 1.1 X10(3)/MCL (ref 0.1–1.3)
MONOCYTES NFR BLD AUTO: 9.4 %
NEUTROPHILS # BLD AUTO: 7.46 X10(3)/MCL (ref 2.1–9.2)
NEUTROPHILS NFR BLD AUTO: 63.5 %
NRBC BLD AUTO-RTO: 0 %
PLATELET # BLD AUTO: 330 X10(3)/MCL (ref 130–400)
PMV BLD AUTO: 11 FL (ref 7.4–10.4)
RBC # BLD AUTO: 4.81 X10(6)/MCL (ref 4.7–6.1)
WBC # SPEC AUTO: 11.74 X10(3)/MCL (ref 4.5–11.5)

## 2024-04-04 PROCEDURE — 85025 COMPLETE CBC W/AUTO DIFF WBC: CPT | Performed by: FAMILY MEDICINE

## 2024-04-11 ENCOUNTER — LAB REQUISITION (OUTPATIENT)
Dept: LAB | Facility: HOSPITAL | Age: 84
End: 2024-04-11
Payer: COMMERCIAL

## 2024-04-11 DIAGNOSIS — D72.820 LYMPHOCYTOSIS (SYMPTOMATIC): ICD-10-CM

## 2024-04-11 LAB
ERYTHROCYTE [DISTWIDTH] IN BLOOD BY AUTOMATED COUNT: 13.2 % (ref 11.5–17)
HCT VFR BLD AUTO: 34.9 % (ref 42–52)
HGB BLD-MCNC: 11.6 G/DL (ref 14–18)
MCH RBC QN AUTO: 31.1 PG (ref 27–31)
MCHC RBC AUTO-ENTMCNC: 33.2 G/DL (ref 33–36)
MCV RBC AUTO: 93.6 FL (ref 80–94)
NRBC BLD AUTO-RTO: 0 %
PLATELET # BLD AUTO: 233 X10(3)/MCL (ref 130–400)
PMV BLD AUTO: 10.4 FL (ref 7.4–10.4)
RBC # BLD AUTO: 3.73 X10(6)/MCL (ref 4.7–6.1)
WBC # SPEC AUTO: 9.79 X10(3)/MCL (ref 4.5–11.5)

## 2024-04-11 PROCEDURE — 85027 COMPLETE CBC AUTOMATED: CPT

## 2024-04-16 ENCOUNTER — LAB REQUISITION (OUTPATIENT)
Dept: LAB | Facility: HOSPITAL | Age: 84
End: 2024-04-16
Payer: MEDICARE

## 2024-04-16 DIAGNOSIS — I48.91 UNSPECIFIED ATRIAL FIBRILLATION: ICD-10-CM

## 2024-04-16 LAB — DIGOXIN SERPL-MCNC: 2.1 NG/ML (ref 0.8–2)

## 2024-04-16 PROCEDURE — 80162 ASSAY OF DIGOXIN TOTAL: CPT | Performed by: FAMILY MEDICINE

## 2024-06-01 ENCOUNTER — HOSPITAL ENCOUNTER (INPATIENT)
Facility: HOSPITAL | Age: 84
LOS: 8 days | Discharge: HOME | DRG: 101 | End: 2024-06-10
Attending: EMERGENCY MEDICINE | Admitting: INTERNAL MEDICINE
Payer: MEDICARE

## 2024-06-01 DIAGNOSIS — I63.9 ISCHEMIC STROKE DIAGNOSED DURING CURRENT ADMISSION: ICD-10-CM

## 2024-06-01 DIAGNOSIS — R09.89 SUSPECTED DEEP VEIN THROMBOSIS (DVT): ICD-10-CM

## 2024-06-01 DIAGNOSIS — R79.89 ELEVATED TROPONIN: Primary | ICD-10-CM

## 2024-06-01 DIAGNOSIS — R55 SYNCOPE: ICD-10-CM

## 2024-06-01 DIAGNOSIS — R00.9 ABNORMAL HEART RATE: ICD-10-CM

## 2024-06-01 DIAGNOSIS — I21.4 NSTEMI (NON-ST ELEVATED MYOCARDIAL INFARCTION): ICD-10-CM

## 2024-06-01 DIAGNOSIS — R56.9 SEIZURE: ICD-10-CM

## 2024-06-01 LAB
ALBUMIN SERPL-MCNC: 3.2 G/DL (ref 3.4–4.8)
ALBUMIN/GLOB SERPL: 1 RATIO (ref 1.1–2)
ALP SERPL-CCNC: 62 UNIT/L (ref 40–150)
ALT SERPL-CCNC: 11 UNIT/L (ref 0–55)
ANION GAP SERPL CALC-SCNC: 14 MEQ/L
AST SERPL-CCNC: 18 UNIT/L (ref 5–34)
BACTERIA #/AREA URNS AUTO: ABNORMAL /HPF
BASOPHILS # BLD AUTO: 0.06 X10(3)/MCL
BASOPHILS NFR BLD AUTO: 0.4 %
BILIRUB SERPL-MCNC: 0.6 MG/DL
BILIRUB UR QL STRIP.AUTO: NEGATIVE
BUN SERPL-MCNC: 19.6 MG/DL (ref 8.4–25.7)
CALCIUM SERPL-MCNC: 8.7 MG/DL (ref 8.8–10)
CHLORIDE SERPL-SCNC: 110 MMOL/L (ref 98–107)
CLARITY UR: CLEAR
CO2 SERPL-SCNC: 20 MMOL/L (ref 23–31)
COLOR UR AUTO: ABNORMAL
CREAT SERPL-MCNC: 0.91 MG/DL (ref 0.73–1.18)
CREAT/UREA NIT SERPL: 22
DIGOXIN SERPL-MCNC: 1 NG/ML (ref 0.8–2)
EOSINOPHIL # BLD AUTO: 0.23 X10(3)/MCL (ref 0–0.9)
EOSINOPHIL NFR BLD AUTO: 1.5 %
ERYTHROCYTE [DISTWIDTH] IN BLOOD BY AUTOMATED COUNT: 14.4 % (ref 11.5–17)
ETHANOL SERPL-MCNC: <10 MG/DL
GFR SERPLBLD CREATININE-BSD FMLA CKD-EPI: >60 ML/MIN/1.73/M2
GLOBULIN SER-MCNC: 3.1 GM/DL (ref 2.4–3.5)
GLUCOSE SERPL-MCNC: 103 MG/DL (ref 82–115)
GLUCOSE UR QL STRIP: NORMAL
HCT VFR BLD AUTO: 39.7 % (ref 42–52)
HGB BLD-MCNC: 12.9 G/DL (ref 14–18)
HGB UR QL STRIP: ABNORMAL
IMM GRANULOCYTES # BLD AUTO: 0.13 X10(3)/MCL (ref 0–0.04)
IMM GRANULOCYTES NFR BLD AUTO: 0.9 %
KETONES UR QL STRIP: NEGATIVE
LEUKOCYTE ESTERASE UR QL STRIP: NEGATIVE
LYMPHOCYTES # BLD AUTO: 1.1 X10(3)/MCL (ref 0.6–4.6)
LYMPHOCYTES NFR BLD AUTO: 7.2 %
MCH RBC QN AUTO: 31 PG (ref 27–31)
MCHC RBC AUTO-ENTMCNC: 32.5 G/DL (ref 33–36)
MCV RBC AUTO: 95.4 FL (ref 80–94)
MONOCYTES # BLD AUTO: 1.17 X10(3)/MCL (ref 0.1–1.3)
MONOCYTES NFR BLD AUTO: 7.7 %
MUCOUS THREADS URNS QL MICRO: ABNORMAL /LPF
NEUTROPHILS # BLD AUTO: 12.6 X10(3)/MCL (ref 2.1–9.2)
NEUTROPHILS NFR BLD AUTO: 82.3 %
NITRITE UR QL STRIP: NEGATIVE
NRBC BLD AUTO-RTO: 0 %
PH UR STRIP: 6.5 [PH]
PLATELET # BLD AUTO: 236 X10(3)/MCL (ref 130–400)
PMV BLD AUTO: 9.9 FL (ref 7.4–10.4)
POTASSIUM SERPL-SCNC: 4.7 MMOL/L (ref 3.5–5.1)
PROT SERPL-MCNC: 6.3 GM/DL (ref 5.8–7.6)
PROT UR QL STRIP: NEGATIVE
RBC # BLD AUTO: 4.16 X10(6)/MCL (ref 4.7–6.1)
RBC #/AREA URNS AUTO: ABNORMAL /HPF
SODIUM SERPL-SCNC: 144 MMOL/L (ref 136–145)
SP GR UR STRIP.AUTO: 1.02 (ref 1–1.03)
SQUAMOUS #/AREA URNS LPF: ABNORMAL /HPF
TROPONIN I SERPL-MCNC: 0.31 NG/ML (ref 0–0.04)
TROPONIN I SERPL-MCNC: 0.65 NG/ML (ref 0–0.04)
UROBILINOGEN UR STRIP-ACNC: 2
WBC # SPEC AUTO: 15.29 X10(3)/MCL (ref 4.5–11.5)
WBC #/AREA URNS AUTO: ABNORMAL /HPF

## 2024-06-01 PROCEDURE — G0378 HOSPITAL OBSERVATION PER HR: HCPCS

## 2024-06-01 PROCEDURE — 85025 COMPLETE CBC W/AUTO DIFF WBC: CPT

## 2024-06-01 PROCEDURE — 84484 ASSAY OF TROPONIN QUANT: CPT | Mod: 91 | Performed by: EMERGENCY MEDICINE

## 2024-06-01 PROCEDURE — 63600175 PHARM REV CODE 636 W HCPCS: Performed by: EMERGENCY MEDICINE

## 2024-06-01 PROCEDURE — 93010 ELECTROCARDIOGRAM REPORT: CPT | Mod: ,,, | Performed by: INTERNAL MEDICINE

## 2024-06-01 PROCEDURE — 81001 URINALYSIS AUTO W/SCOPE: CPT

## 2024-06-01 PROCEDURE — 96365 THER/PROPH/DIAG IV INF INIT: CPT

## 2024-06-01 PROCEDURE — 82077 ASSAY SPEC XCP UR&BREATH IA: CPT

## 2024-06-01 PROCEDURE — 80162 ASSAY OF DIGOXIN TOTAL: CPT | Performed by: EMERGENCY MEDICINE

## 2024-06-01 PROCEDURE — 93005 ELECTROCARDIOGRAM TRACING: CPT

## 2024-06-01 PROCEDURE — 80053 COMPREHEN METABOLIC PANEL: CPT

## 2024-06-01 PROCEDURE — 84484 ASSAY OF TROPONIN QUANT: CPT

## 2024-06-01 PROCEDURE — 99285 EMERGENCY DEPT VISIT HI MDM: CPT | Mod: 25

## 2024-06-01 RX ORDER — CARVEDILOL 12.5 MG/1
12.5 TABLET ORAL 2 TIMES DAILY
COMMUNITY

## 2024-06-01 RX ORDER — LEVETIRACETAM 15 MG/ML
1500 INJECTION INTRAVASCULAR
Status: COMPLETED | OUTPATIENT
Start: 2024-06-01 | End: 2024-06-01

## 2024-06-01 RX ORDER — DIGOXIN 250 MCG
250 TABLET ORAL DAILY
COMMUNITY

## 2024-06-01 RX ORDER — DONEPEZIL HYDROCHLORIDE 10 MG/1
10 TABLET, FILM COATED ORAL NIGHTLY
COMMUNITY
Start: 2024-05-27

## 2024-06-01 RX ORDER — GABAPENTIN 300 MG/1
300 CAPSULE ORAL NIGHTLY
COMMUNITY

## 2024-06-01 RX ORDER — SODIUM CHLORIDE 0.9 % (FLUSH) 0.9 %
10 SYRINGE (ML) INJECTION
Status: DISCONTINUED | OUTPATIENT
Start: 2024-06-01 | End: 2024-06-10 | Stop reason: HOSPADM

## 2024-06-01 RX ORDER — SACUBITRIL AND VALSARTAN 24; 26 MG/1; MG/1
1 TABLET, FILM COATED ORAL 2 TIMES DAILY
COMMUNITY

## 2024-06-01 RX ORDER — MEMANTINE HYDROCHLORIDE 10 MG/1
10 TABLET ORAL 2 TIMES DAILY
COMMUNITY
Start: 2024-05-27

## 2024-06-01 RX ORDER — BUSPIRONE HYDROCHLORIDE 5 MG/1
5 TABLET ORAL 2 TIMES DAILY
COMMUNITY
Start: 2024-05-29

## 2024-06-01 RX ORDER — FAMOTIDINE 20 MG/1
20 TABLET, FILM COATED ORAL 2 TIMES DAILY
COMMUNITY

## 2024-06-01 RX ORDER — APIXABAN 5 MG/1
5 TABLET, FILM COATED ORAL 2 TIMES DAILY
COMMUNITY

## 2024-06-01 RX ORDER — CYCLOSPORINE 0.5 MG/ML
1 EMULSION OPHTHALMIC 2 TIMES DAILY
COMMUNITY
Start: 2024-03-26

## 2024-06-01 RX ADMIN — LEVETIRACETAM INJECTION 1500 MG: 15 INJECTION INTRAVENOUS at 09:06

## 2024-06-01 NOTE — FIRST PROVIDER EVALUATION
"Medical screening examination initiated.  I have conducted a focused provider triage encounter, findings are as follows:    Brief history of present illness:  83-year-old male presents to the ER for evaluation following seizure-like activity and syncope.  Patient is a resident at a nursing facility.  Per nursing facility, the patient had about 3 minutes not responding/possible seizure activity.  Patient has a history of CVA and communication disorder.  No documented history of seizures.    Vitals:    06/01/24 1820   BP: (!) 155/76   Pulse: 70   Resp: 16   Temp: 98.1 °F (36.7 °C)   TempSrc: Axillary   SpO2: 96%   Weight: 79.4 kg (175 lb)   Height: 5' 7" (1.702 m)       Pertinent physical exam:  Alert, nonverbal, unable to assess orientation, on stretcher.    Brief workup plan:  Labs, urine, EKG, imaging    Preliminary workup initiated; this workup will be continued and followed by the physician or advanced practice provider that is assigned to the patient when roomed.  "

## 2024-06-01 NOTE — Clinical Note
Diagnosis: Seizure [[id number]]   Future Attending Provider: KAROL CHASE [[id number]]   Admit to which facility:: [facility] [[id number]]

## 2024-06-02 PROBLEM — R56.9 SEIZURE: Status: ACTIVE | Noted: 2024-06-02

## 2024-06-02 LAB
ANION GAP SERPL CALC-SCNC: 13 MEQ/L
AV INDEX (PROSTH): 0.55
AV MEAN GRADIENT: 5 MMHG
AV PEAK GRADIENT: 8 MMHG
AV REGURGITATION PRESSURE HALF TIME: 431 MS
AV VALVE AREA BY VELOCITY RATIO: 1.9 CM²
AV VALVE AREA: 1.9 CM²
AV VELOCITY RATIO: 0.55
BASOPHILS # BLD AUTO: 0.04 X10(3)/MCL
BASOPHILS NFR BLD AUTO: 0.3 %
BSA FOR ECHO PROCEDURE: 1.77 M2
BUN SERPL-MCNC: 16.6 MG/DL (ref 8.4–25.7)
CALCIUM SERPL-MCNC: 8.3 MG/DL (ref 8.8–10)
CHLORIDE SERPL-SCNC: 112 MMOL/L (ref 98–107)
CO2 SERPL-SCNC: 17 MMOL/L (ref 23–31)
CREAT SERPL-MCNC: 0.85 MG/DL (ref 0.73–1.18)
CREAT/UREA NIT SERPL: 20
CV ECHO LV RWT: 0.37 CM
DOP CALC AO PEAK VEL: 1.42 M/S
DOP CALC AO VTI: 36.9 CM
DOP CALC LVOT AREA: 3.5 CM2
DOP CALC LVOT DIAMETER: 2.1 CM
DOP CALC LVOT PEAK VEL: 0.78 M/S
DOP CALC LVOT STROKE VOLUME: 69.93 CM3
DOP CALC MV VTI: 24.3 CM
DOP CALCLVOT PEAK VEL VTI: 20.2 CM
ECHO LV POSTERIOR WALL: 0.96 CM (ref 0.6–1.1)
EOSINOPHIL # BLD AUTO: 0.05 X10(3)/MCL (ref 0–0.9)
EOSINOPHIL NFR BLD AUTO: 0.4 %
ERYTHROCYTE [DISTWIDTH] IN BLOOD BY AUTOMATED COUNT: 14.6 % (ref 11.5–17)
FRACTIONAL SHORTENING: 29 % (ref 28–44)
GFR SERPLBLD CREATININE-BSD FMLA CKD-EPI: >60 ML/MIN/1.73/M2
GLUCOSE SERPL-MCNC: 90 MG/DL (ref 82–115)
HCT VFR BLD AUTO: 38.3 % (ref 42–52)
HGB BLD-MCNC: 12.4 G/DL (ref 14–18)
IMM GRANULOCYTES # BLD AUTO: 0.08 X10(3)/MCL (ref 0–0.04)
IMM GRANULOCYTES NFR BLD AUTO: 0.7 %
INTERVENTRICULAR SEPTUM: 0.94 CM (ref 0.6–1.1)
LEFT ATRIUM SIZE: 3.9 CM
LEFT INTERNAL DIMENSION IN SYSTOLE: 3.7 CM (ref 2.1–4)
LEFT VENTRICLE DIASTOLIC VOLUME INDEX: 72.88 ML/M2
LEFT VENTRICLE DIASTOLIC VOLUME: 129 ML
LEFT VENTRICLE MASS INDEX: 102 G/M2
LEFT VENTRICLE SYSTOLIC VOLUME INDEX: 32.8 ML/M2
LEFT VENTRICLE SYSTOLIC VOLUME: 58.1 ML
LEFT VENTRICULAR INTERNAL DIMENSION IN DIASTOLE: 5.19 CM (ref 3.5–6)
LEFT VENTRICULAR MASS: 180.81 G
LVOT MG: 1 MMHG
LVOT MV: 0.52 CM/S
LYMPHOCYTES # BLD AUTO: 1.5 X10(3)/MCL (ref 0.6–4.6)
LYMPHOCYTES NFR BLD AUTO: 13 %
MCH RBC QN AUTO: 31.4 PG (ref 27–31)
MCHC RBC AUTO-ENTMCNC: 32.4 G/DL (ref 33–36)
MCV RBC AUTO: 97 FL (ref 80–94)
MONOCYTES # BLD AUTO: 1.34 X10(3)/MCL (ref 0.1–1.3)
MONOCYTES NFR BLD AUTO: 11.6 %
MV MEAN GRADIENT: 2 MMHG
MV PEAK E VEL: 1.22 M/S
MV PEAK GRADIENT: 6 MMHG
MV VALVE AREA BY CONTINUITY EQUATION: 2.88 CM2
NEUTROPHILS # BLD AUTO: 8.57 X10(3)/MCL (ref 2.1–9.2)
NEUTROPHILS NFR BLD AUTO: 74 %
NRBC BLD AUTO-RTO: 0 %
OHS CV RV/LV RATIO: 0.55 CM
OHS QRS DURATION: 74 MS
OHS QRS DURATION: 80 MS
OHS QTC CALCULATION: 388 MS
OHS QTC CALCULATION: 409 MS
PISA AR MAX VEL: 3.71 M/S
PISA TR MAX VEL: 2.91 M/S
PLATELET # BLD AUTO: 171 X10(3)/MCL (ref 130–400)
PMV BLD AUTO: 11.5 FL (ref 7.4–10.4)
POTASSIUM SERPL-SCNC: 4.9 MMOL/L (ref 3.5–5.1)
RA MAJOR: 5.16 CM
RA PRESSURE ESTIMATED: 3 MMHG
RA WIDTH: 4.02 CM
RBC # BLD AUTO: 3.95 X10(6)/MCL (ref 4.7–6.1)
RIGHT VENTRICULAR END-DIASTOLIC DIMENSION: 2.87 CM
RV TB RVSP: 6 MMHG
SODIUM SERPL-SCNC: 142 MMOL/L (ref 136–145)
TR MAX PG: 34 MMHG
TRICUSPID ANNULAR PLANE SYSTOLIC EXCURSION: 1.6 CM
TROPONIN I SERPL-MCNC: 0.32 NG/ML (ref 0–0.04)
TV REST PULMONARY ARTERY PRESSURE: 37 MMHG
WBC # SPEC AUTO: 11.58 X10(3)/MCL (ref 4.5–11.5)
Z-SCORE OF LEFT VENTRICULAR DIMENSION IN END DIASTOLE: 0.59
Z-SCORE OF LEFT VENTRICULAR DIMENSION IN END SYSTOLE: 1.59

## 2024-06-02 PROCEDURE — 21400001 HC TELEMETRY ROOM

## 2024-06-02 PROCEDURE — 93005 ELECTROCARDIOGRAM TRACING: CPT

## 2024-06-02 PROCEDURE — 36415 COLL VENOUS BLD VENIPUNCTURE: CPT | Performed by: EMERGENCY MEDICINE

## 2024-06-02 PROCEDURE — 25000003 PHARM REV CODE 250: Performed by: INTERNAL MEDICINE

## 2024-06-02 PROCEDURE — 93010 ELECTROCARDIOGRAM REPORT: CPT | Mod: ,,, | Performed by: INTERNAL MEDICINE

## 2024-06-02 PROCEDURE — 85025 COMPLETE CBC W/AUTO DIFF WBC: CPT | Performed by: EMERGENCY MEDICINE

## 2024-06-02 PROCEDURE — 63600175 PHARM REV CODE 636 W HCPCS: Performed by: NURSE PRACTITIONER

## 2024-06-02 PROCEDURE — 99222 1ST HOSP IP/OBS MODERATE 55: CPT | Mod: ,,, | Performed by: PSYCHIATRY & NEUROLOGY

## 2024-06-02 PROCEDURE — 84484 ASSAY OF TROPONIN QUANT: CPT | Performed by: EMERGENCY MEDICINE

## 2024-06-02 PROCEDURE — 63600175 PHARM REV CODE 636 W HCPCS: Performed by: INTERNAL MEDICINE

## 2024-06-02 PROCEDURE — 80048 BASIC METABOLIC PNL TOTAL CA: CPT | Performed by: EMERGENCY MEDICINE

## 2024-06-02 RX ORDER — LEVETIRACETAM 500 MG/1
500 TABLET ORAL 2 TIMES DAILY
Status: DISCONTINUED | OUTPATIENT
Start: 2024-06-02 | End: 2024-06-02

## 2024-06-02 RX ORDER — LEVETIRACETAM 500 MG/5ML
500 INJECTION, SOLUTION, CONCENTRATE INTRAVENOUS ONCE
Status: COMPLETED | OUTPATIENT
Start: 2024-06-02 | End: 2024-06-02

## 2024-06-02 RX ORDER — BISACODYL 10 MG/1
10 SUPPOSITORY RECTAL DAILY PRN
Status: DISCONTINUED | OUTPATIENT
Start: 2024-06-02 | End: 2024-06-10 | Stop reason: HOSPADM

## 2024-06-02 RX ORDER — LORAZEPAM 2 MG/ML
0.5 INJECTION INTRAMUSCULAR EVERY 4 HOURS PRN
Status: DISCONTINUED | OUTPATIENT
Start: 2024-06-02 | End: 2024-06-10 | Stop reason: HOSPADM

## 2024-06-02 RX ORDER — LABETALOL HYDROCHLORIDE 5 MG/ML
10 INJECTION, SOLUTION INTRAVENOUS
Status: DISCONTINUED | OUTPATIENT
Start: 2024-06-02 | End: 2024-06-08

## 2024-06-02 RX ORDER — LEVETIRACETAM 500 MG/5ML
1000 INJECTION, SOLUTION, CONCENTRATE INTRAVENOUS EVERY 12 HOURS
Status: DISCONTINUED | OUTPATIENT
Start: 2024-06-02 | End: 2024-06-03

## 2024-06-02 RX ADMIN — LEVETIRACETAM 500 MG: 100 INJECTION, SOLUTION INTRAVENOUS at 03:06

## 2024-06-02 RX ADMIN — LEUCINE, PHENYLALANINE, LYSINE, METHIONINE, ISOLEUCINE, VALINE, HISTIDINE, THREONINE, TRYPTOPHAN, ALANINE, GLYCINE, ARGININE, PROLINE, SERINE, TYROSINE, SODIUM ACETATE, DIBASIC POTASSIUM PHOSPHATE, MAGNESIUM CHLORIDE, SODIUM CHLORIDE, CALCIUM CHLORIDE, DEXTROSE
311; 238; 247; 170; 255; 247; 204; 179; 77; 880; 438; 489; 289; 213; 17; 297; 261; 51; 77; 33; 5 INJECTION INTRAVENOUS at 06:06

## 2024-06-02 RX ADMIN — LEVETIRACETAM 1000 MG: 100 INJECTION, SOLUTION INTRAVENOUS at 09:06

## 2024-06-02 RX ADMIN — LEVETIRACETAM 500 MG: 100 INJECTION, SOLUTION INTRAVENOUS at 11:06

## 2024-06-02 NOTE — ED PROVIDER NOTES
"Encounter Date: 6/1/2024    SCRIBE #1 NOTE: I, Mckay Dickey, am scribing for, and in the presence of,  Mónica Muro MD. I have scribed the following portions of the note - Other sections scribed: HPI,ROS,PE.       History     Chief Complaint   Patient presents with    Seizures     Seizure like activity at NH with AMS, NH reports "shaking everywhere for 3 min with eyes rolled back in head that did not respond to sternal rub". Cbg 104. initial gcs 10 with ems     84 y/o male with PMHx of afib, HTN, CHF, and CVA (right sided deficits), presents to ED via EMS for possible seizure onset today. Nursing home reports pt's whole body was shaking, and his eyes rolled back, blood in spit coming from his mouth during the episode. Nursing home states pt usually doesn't speak. No other trauma reported. Per nursing home last .       History and ROS limited due to pt nonverbal.     The history is provided by the nursing home. History limited by: pt nonverbal. No  was used.     Review of patient's allergies indicates:   Allergen Reactions    Penicillins      History reviewed. No pertinent past medical history.  History reviewed. No pertinent surgical history.  No family history on file.     Review of Systems   Unable to perform ROS: Patient nonverbal       Physical Exam     Initial Vitals [06/01/24 1820]   BP Pulse Resp Temp SpO2   (!) 155/76 70 16 98.1 °F (36.7 °C) 96 %      MAP       --         Physical Exam    Nursing note and vitals reviewed.  Constitutional: He appears well-developed and well-nourished. No distress.   HENT:   Head: Normocephalic and atraumatic.   Dry lips     Eyes: Conjunctivae and EOM are normal. Pupils are equal, round, and reactive to light.   Neck: Neck supple.   No cervical spine step-off   Cardiovascular:  Normal rate and intact distal pulses. An irregularly irregular rhythm present.           Pulmonary/Chest: Breath sounds normal. No respiratory distress.   Abdominal: " Abdomen is soft. Bowel sounds are normal. He exhibits no distension. There is no abdominal tenderness.   Musculoskeletal:         General: No edema.      Cervical back: Neck supple.      Lumbar back: Normal.      Comments: Moves all extremities     Neurological: He is alert. GCS eye subscore is 4. GCS verbal subscore is 1. GCS motor subscore is 6.   Right sided hemiparesis  Pt nonverbal  Intermittently follows simple commands   Skin: Skin is warm, dry and intact. No rash noted.   Psychiatric: He has a normal mood and affect.         ED Course   Critical Care    Date/Time: 6/1/2024 11:12 PM    Performed by: Mónica Muro MD  Authorized by: Mónica Muro MD  Direct patient critical care time: 31 minutes  Total critical care time (exclusive of procedural time) : 31 minutes  Critical care time was exclusive of separately billable procedures and treating other patients.  Critical care was necessary to treat or prevent imminent or life-threatening deterioration of the following conditions: CNS failure or compromise and cardiac failure.  Critical care was time spent personally by me on the following activities: development of treatment plan with patient or surrogate, discussions with consultants, evaluation of patient's response to treatment, examination of patient, obtaining history from patient or surrogate, ordering and performing treatments and interventions, ordering and review of laboratory studies, ordering and review of radiographic studies, pulse oximetry, re-evaluation of patient's condition and review of old charts.        Labs Reviewed   URINALYSIS, REFLEX TO URINE CULTURE - Abnormal; Notable for the following components:       Result Value    Blood, UA 1+ (*)     Urobilinogen, UA 2.0 (*)     Mucous, UA Trace (*)     RBC, UA 21-50 (*)     All other components within normal limits   TROPONIN I - Abnormal; Notable for the following components:    Troponin-I 0.308 (*)     All other components within normal  limits   COMPREHENSIVE METABOLIC PANEL - Abnormal; Notable for the following components:    Chloride 110 (*)     CO2 20 (*)     Calcium 8.7 (*)     Albumin 3.2 (*)     Albumin/Globulin Ratio 1.0 (*)     All other components within normal limits   CBC WITH DIFFERENTIAL - Abnormal; Notable for the following components:    WBC 15.29 (*)     RBC 4.16 (*)     Hgb 12.9 (*)     Hct 39.7 (*)     MCV 95.4 (*)     MCHC 32.5 (*)     Neut # 12.60 (*)     IG# 0.13 (*)     All other components within normal limits   ALCOHOL,MEDICAL (ETHANOL) - Normal   DIGOXIN LEVEL - Normal   CBC W/ AUTO DIFFERENTIAL    Narrative:     The following orders were created for panel order CBC Auto Differential.  Procedure                               Abnormality         Status                     ---------                               -----------         ------                     CBC with Differential[[id number]]       Abnormal            Final result                 Please view results for these tests on the individual orders.     EKG Readings: (Independently Interpreted)   Initial Reading: No STEMI. Heart Rate: 56. Ectopy: No Ectopy. Conduction: Normal.   Taken at 1822       Imaging Results              X-Ray Chest 1 View (Final result)  Result time 06/01/24 22:46:44      Final result by Barber Moore MD (06/01/24 22:46:44)                   Impression:      Obscuring of both hemidiaphragms cannot rule out small effusions and/or atelectasis.      Electronically signed by: Barber Moore MD  Date:    06/01/2024  Time:    22:46               Narrative:    EXAMINATION:  XR CHEST 1 VIEW    CLINICAL HISTORY:  syncope;    TECHNIQUE:  Single frontal view of the chest was performed.    COMPARISON:  02/24/2021    FINDINGS:  There are faint increased markings in the right lung base with obscuring of the lateral portion of the right hemidiaphragm.  Cannot rule out small effusion.  The left hemidiaphragm is obscured.                                        CT Head Without Contrast (Final result)  Result time 06/02/24 07:50:02      Final result by Geronimo Vaughan MD (06/02/24 07:50:02)                   Impression:      No acute intracranial abnormality.    [facility] concordance      Electronically signed by: Geronimo Vaughan MD  Date:    06/02/2024  Time:    07:50               Narrative:    EXAMINATION:  CT HEAD WITHOUT CONTRAST    CLINICAL HISTORY:  Seizure, new-onset, no history of trauma;    TECHNIQUE:  Axial images of the head were obtained without IV contrast administration.  Coronal and sagittal reconstructions were provided.  Three dimensional and MIP images were obtained and evaluated.  Total DLP was 1097 mGy-cm. Dose lowering technique and automated exposure control were utilized for this exam.    COMPARISON:  CT of the head 07/09/2021.    FINDINGS:  There is normal brain formation.  There is extensive chronic encephalomalacia throughout the left MCA distribution as well as the right occipital lobe.  There is periventricular and subcortical white matter hypodensity.  There is no hemorrhage, hydrocephalus, or midline shift.  There is no cytotoxic or vasogenic edema.  There is no intra or extra-axial fluid collection.  There is no herniation.    The calvarium is intact.  There is no fracture.  The bilateral orbits are normal.  The paranasal sinuses and mastoid air cells are normally developed and free of disease.                        Preliminary result by Isaak Armijo MD (06/01/24 19:37:58)                   Impression:    1. No acute intracranial process identified. Details and other findings as noted above.               Narrative:    START OF REPORT:  Technique: CT of the head was performed without intravenous contrast with axial as well as coronal and sagittal images.    Comparison: Comparison is with study dated 2021-07-09 10:41:42.    Dosage Information: Automated exposure control was utilized.    Clinical history: Seizure like activity at NH with [facility], NH  reports shaking everywhere for 3 min with eyes rolled back in head that did not respond to sternal rub. Cbg 104. initial gcs 10 with ems.    Findings:  Hemorrhage: No acute intracranial hemorrhage is seen.  CSF spaces: The ventricles, sulci and basal cisterns all appear mildly prominent consistent with global cerebral atrophy. Additionally the ventricles appear dilated out of proportion to the sulci suggesting an element of concurrent non-obstructive hydrocephalus.  Brain parenchyma: No acute infarct is identified. Pronounced stable appearing microvascular change is seen in portions of the periventricular and deep white matter tracts. A small, stable appearing region of encephalomalacic change is again seen in the right occipital lobe. A large region of encephalomalacic change is seen involving the left middle cerebral artery territory, and is new compared to the previous study. These are consistent with chronic infarct.  Vascular territory infarcts:  Lacunar infarcts: There is a 4.7 mm lacunar infarct seen on Image 28, Series 2 in the right caudate head.  Cerebellum: Unremarkable.  Vascular: Pronounced stable appearing atheromatous calcification of the intracranial arteries is seen.  Sella and skull base: The sella appears to be within normal limits for age.  Cerebellopontine angles: Within normal limits.  Herniation: None.  Intracranial calcifications: Incidental note is made of bilateral choroid plexus calcification. Incidental note is made of some pineal region calcification.  Calvarium: No acute linear or depressed skull fracture is seen.    Maxillofacial Structures:  Paranasal sinuses: The visualized paranasal sinuses appear clear with no mucoperiosteal thickening or air fluid levels identified.  Orbits: The orbits appear unremarkable.  Zygomatic arches: The zygomatic arches are intact and unremarkable.  Temporal bones and mastoids: The temporal bones and mastoids appear unremarkable.  TMJ: The mandibular  condyles appear normally placed with respect to the mandibular fossa.    Visualized upper cervical spine: The visualized cervical spine appears unremarkable.                                      X-Rays:   Independently Interpreted Readings:   Chest X-Ray: Blunting of the costophrenic angle bilaterally   Head CT: No hemorrhage. Remote infarct present.    Medications   sodium chloride 0.9% flush 10 mL (has no administration in time range)   LORazepam injection 0.5 mg (has no administration in time range)   labetaloL injection 10 mg (has no administration in time range)   bisacodyL suppository 10 mg (has no administration in time range)   levETIRAcetam injection 1,000 mg (has no administration in time range)   levETIRAcetam injection 500 mg (has no administration in time range)   levETIRAcetam in NaCl (iso-os) IVPB 1,500 mg (0 mg Intravenous Stopped 6/1/24 2200)             Scribe Attestation:   Scribe #1: I performed the above scribed service and the documentation accurately describes the services I performed. I attest to the accuracy of the note.    Attending Attestation:           Physician Attestation for Scribe:  Physician Attestation Statement for Scribe #1: I, Mónica Muro MD, reviewed documentation, as scribed by Mckay Dickey in my presence, and it is both accurate and complete.         Medical Decision Making  82 yo M here after seizure like activity, currently at baseline mental status. Afebrile, hypertensive. He is nonverbal but does intermittently follow a simple command. Labs including troponin with CT head and CXR ordered after evaluation in triage. Labs remarkable for leukocytosis, possibly from seizure activity, and elevated troponin. EKG nonischemic. CXR with possible pleural effusion, no focal infiltrate. CT head negative for ICH. There is mention of a new area of encephalomalacia in the L MCA territory; this infarct is previously documented in his medical history. I am less concerned with ACS at  "this time, but as the patient is nonverbal, serial troponins considered. I will attempt to contact family to discuss plan of care and advanced directives.      The differential diagnosis includes, but is not limited to, syncope, seizure, electrolyte abnormality, renal dysfunction, malignancy, ICH, and others.    Problems Addressed:  Seizure: acute illness or injury that poses a threat to life or bodily functions    Amount and/or Complexity of Data Reviewed  Independent Historian: caregiver  Labs: ordered. Decision-making details documented in ED Course.  Radiology: ordered and independent interpretation performed. Decision-making details documented in ED Course.  ECG/medicine tests: ordered and independent interpretation performed. Decision-making details documented in ED Course.    Risk  Prescription drug management.  Decision regarding hospitalization.    Critical Care  Total time providing critical care: 31 minutes            ED Course as of 06/02/24 1407   Sat Jun 01, 2024 2100 Paged [facility] [RAQUEL]   2112 Spoke to nephew: States he is currently at baseline. says pt is nonverbal usually says "yes" occasionally. States pt reaches out to everyone trying to grab their hand. Nephew states that there is recent suspicion for prostate cancer, family hasn't gone for treatment plans after being told he likely isn't a candidate for treatment due to fragile state. OK to admit if need be. [RB]   2114 Called to bedside because pt was having a seizure, when at bedside pt was not actively seizing, but was postictal. I will start Keppra and admit for neuro evaluation as well as cardiology evaluation considering elevated troponin. My suspicion for ACS is low. Concern for metastatic disease if he does in fact have prostate cancer although CT head negative. He does have encephalomalacia from stroke which could also be the etiology of seizure. Consider inpatient MRI per primary team. [RB]   2120 Paged on call for Vasquez Eastman MD " "[RAQUEL]   2120  Spoke to Ramya Clarke NP, on call for [facility]. Will hold on anticoagulation until next troponin.  [RB]      ED Course User Index  [RAQUEL] Mckay Dickey  [RB] Mónica Muro MD                   BP (!) 184/70   Pulse 61   Temp 98.2 °F (36.8 °C) (Oral)   Resp 18   Ht 5' 7" (1.702 m)   Wt 66.3 kg (146 lb 2.6 oz)   SpO2 96%   BMI 22.89 kg/m²         Clinical Impression:  Final diagnoses:  [R55] Syncope  [R56.9] Seizure  [R79.89] Elevated troponin (Primary)          ED Disposition Condition    Observation Stable                Mónica Muro MD  06/02/24 1407       Mónica Muro MD  06/02/24 1412    "

## 2024-06-02 NOTE — H&P
"[facility] - Neurology    History & Physical      Patient Name: Bandar Medeiros  MRN: [id number]  Admission Date: 6/1/2024  Attending Physician: Vasquez Eastman MD  Primary Care Provider: Natasha Primary Doctor         Patient information was obtained from ER records.     Subjective:     Principal Problem:<principal problem not specified>    Chief Complaint:   Chief Complaint   Patient presents with    Seizures     Seizure like activity at NH with AMS, NH reports "shaking everywhere for 3 min with eyes rolled back in head that did not respond to sternal rub". Cbg 104. initial gcs 10 with ems        HPI: 83-year-old male presents to the ER for evaluation following seizure-like activity and syncope. Patient is a resident at a nursing facility. Per nursing facility, the patient had about 3 minutes not responding/possible seizure activity. Patient has a history of CVA and communication disorder. No documented history of seizures.     History reviewed. No pertinent past medical history.    History reviewed. No pertinent surgical history.    Review of patient's allergies indicates:   Allergen Reactions    Penicillins        No current facility-administered medications on file prior to encounter.     Current Outpatient Medications on File Prior to Encounter   Medication Sig    busPIRone (BUSPAR) 5 MG Tab Take 5 mg by mouth 2 (two) times daily.    carvediloL (COREG) 12.5 MG tablet Take 12.5 mg by mouth 2 (two) times daily.    digoxin (LANOXIN) 250 mcg tablet Take 250 mcg by mouth once daily.    donepeziL (ARICEPT) 10 MG tablet Take 10 mg by mouth every evening.    ELIQUIS 5 mg Tab Take 5 mg by mouth 2 (two) times daily.    ENTRESTO 24-26 mg per tablet Take 1 tablet by mouth 2 (two) times daily.    famotidine (PEPCID) 20 MG tablet Take 20 mg by mouth 2 (two) times daily.    memantine (NAMENDA) 10 MG Tab Take 10 mg by mouth 2 (two) times daily.    RESTASIS 0.05 % ophthalmic emulsion Place 1 drop into both eyes 2 (two) " times daily.    gabapentin (NEURONTIN) 300 MG capsule Take 300 mg by mouth every evening.     Family History    None       Tobacco Use    Smoking status: Not on file    Smokeless tobacco: Not on file   Substance and Sexual Activity    Alcohol use: Not on file    Drug use: Not on file    Sexual activity: Not on file     Review of Systems   Unable to perform ROS: Mental status change     Objective:     Vital Signs (Most Recent):  Temp: 98.2 °F (36.8 °C) (06/02/24 0753)  Pulse: 76 (06/02/24 0753)  Resp: 18 (06/02/24 0407)  BP: (!) 154/59 (06/02/24 0753)  SpO2: 98 % (06/02/24 0407) Vital Signs (24h Range):  Temp:  [97.7 °F (36.5 °C)-98.2 °F (36.8 °C)] 98.2 °F (36.8 °C)  Pulse:  [63-82] 76  Resp:  [16-19] 18  SpO2:  [96 %-98 %] 98 %  BP: (138-164)/(59-78) 154/59     Weight: 66.3 kg (146 lb 2.6 oz)  Body mass index is 22.89 kg/m².    Physical Exam  HENT:      Head: Normocephalic and atraumatic.      Right Ear: External ear normal.      Left Ear: External ear normal.   Cardiovascular:      Rate and Rhythm: Normal rate and regular rhythm.      Pulses: Normal pulses.      Heart sounds: Normal heart sounds.   Musculoskeletal:      Cervical back: Neck supple.   Skin:     General: Skin is warm and dry.   Neurological:      Comments: Can not be explored secondary to the patient not being arousable            Significant Labs: All pertinent labs within the past 24 hours have been reviewed.  BMP:   Recent Labs   Lab 06/02/24  0846      K 4.9   *   CO2 17*   BUN 16.6   CREATININE 0.85   CALCIUM 8.3*     CBC:   Recent Labs   Lab 06/01/24  1847 06/02/24  0845   WBC 15.29* 11.58*   HGB 12.9* 12.4*   HCT 39.7* 38.3*    171       Significant Imaging: I have reviewed all pertinent imaging results/findings within the past 24 hours.  CT: I have reviewed all pertinent results/findings within the past 24 hours and my personal findings are:  No acute changes.    Assessment/Plan:     There are no hospital problems to display  for this patient.    VTE Risk Mitigation (From admission, onward)           Ordered     IP VTE HIGH RISK PATIENT  Once         06/01/24 2256     Place sequential compression device  Until discontinued         06/01/24 2256                  Patient here brought in due to new onset seizures.  He is DNR I will continue with the DNR order.  He has not arousable.  I suspect he had another stroke.  CT scan was normal but I will order an MRI.  Patient presently NPO secondary to not being arousable.  This is a significant change from his usual stayed of behavior.  I started the stroke protocol workup.  Neurology is already has been consulted.  Cardiology has been consulted due to slight bump in troponins.  This could be secondary to the new onset seizures.  Cardiology will be following this troponins already fallen.  White blood cell count was elevated but it is coming down this could be a leukemoid reaction.  UA was nitrite negative.  Repeat CBC BMP for tomorrow.  DVT prophylaxis waxes with Aurelio hoses.  No family members present at the moment.  Discuss plan with Sanyaeden patient's nurse.    Vasquez Eastman MD  Department of Hospital Medicine   [facility] - Neurology

## 2024-06-02 NOTE — CONSULTS
Inpatient consult to Cardiology  Consult performed by: Ray Cordova FNP  Consult ordered by: Mónica Muro MD  Reason for consult: Elevated Troponin        [facility] - Neurology    Cardiology  Consult Note    Patient Name: Bandar Medeiros  MRN: [id number]  Admission Date: 6/1/2024  Hospital Length of Stay: 0 days  Code Status: DNR   Attending Provider: Vasquez Eastman MD   Consulting Provider: MOUNA Gibbons  Primary Care Physician: Natasha, Primary Doctor  Principal Problem:<principal problem not specified>    Patient information was obtained from past medical records, ER records, and primary team.     Subjective:   Chief Complaint/Reason for Consult: Elevated Troponin    HPI:   Mr. Medeiros is a 83 year old male, known to Dr. Thompson (2021), who presented to the hospital from local nursing home due to persistent shaking and unconsciousness (eyes rolled back with no response to sternal rub). Patient at baseline speaks very little, history of CVA with chronic right sided deficits. Troponin noted to be 0.3 with trend up to 0.6. Leukocytosis noted. CT Head negative for acute intracranial abnormality. Chest XR revealed Obscuring of both hemidiaphragms cannot rule out small effusions and/or atelectasis. EKG revealed AF SVR with HR High 50's. CIS consulted given abnormal troponin value.    PMH: OA, Hypertension, Dyslipidemia, SVT, PAF (Eliquis), LVSD  PSH: Appendectomy  Family History: Father- Cancer/CAD, Mother- CAD, Sister- Hypertension  Social History: Tobacco- Negative, Alcohol- Negative, Substance Abuse- Negative    Previous Cardiac Diagnostics:   Echocardiogram (8.23.21):  Normal left ventricular size with moderately depressed ventricular   function with an estimated ejection fraction of 35 to 40%   Left ventricular hypertrophy   Left atrial enlargement   Moderate mitral regurgitation   Moderate tricuspid regurgitation with an estimated PA systolic pressure of   48 mmHg   Negative bubble study      Carotid US (5.14.21):  ARACELI/LICA < 50% Stenosis- No Hemodynamically Significant Stenoses.  Patent Antegrade Flow of Bilateral Vertebral Arteries    Review of patient's allergies indicates:   Allergen Reactions    Penicillins      No current facility-administered medications on file prior to encounter.     Current Outpatient Medications on File Prior to Encounter   Medication Sig    busPIRone (BUSPAR) 5 MG Tab Take 5 mg by mouth 2 (two) times daily.    carvediloL (COREG) 12.5 MG tablet Take 12.5 mg by mouth 2 (two) times daily.    digoxin (LANOXIN) 250 mcg tablet Take 250 mcg by mouth once daily.    donepeziL (ARICEPT) 10 MG tablet Take 10 mg by mouth every evening.    ELIQUIS 5 mg Tab Take 5 mg by mouth 2 (two) times daily.    ENTRESTO 24-26 mg per tablet Take 1 tablet by mouth 2 (two) times daily.    famotidine (PEPCID) 20 MG tablet Take 20 mg by mouth 2 (two) times daily.    memantine (NAMENDA) 10 MG Tab Take 10 mg by mouth 2 (two) times daily.    RESTASIS 0.05 % ophthalmic emulsion Place 1 drop into both eyes 2 (two) times daily.    gabapentin (NEURONTIN) 300 MG capsule Take 300 mg by mouth every evening.     Review of Systems   Unable to perform ROS: Patient nonverbal     Objective:     Vital Signs (Most Recent):  Temp: 98.2 °F (36.8 °C) (06/02/24 0753)  Pulse: 76 (06/02/24 0753)  Resp: 18 (06/02/24 0407)  BP: (!) 154/59 (06/02/24 0753)  SpO2: 98 % (06/02/24 0407) Vital Signs (24h Range):  Temp:  [97.7 °F (36.5 °C)-98.2 °F (36.8 °C)] 98.2 °F (36.8 °C)  Pulse:  [63-82] 76  Resp:  [16-19] 18  SpO2:  [96 %-98 %] 98 %  BP: (138-164)/(59-78) 154/59   Weight: 66.3 kg (146 lb 2.6 oz)  Body mass index is 22.89 kg/m².  SpO2: 98 %     No intake or output data in the 24 hours ending 06/02/24 0857  Lines/Drains/Airways       Peripheral Intravenous Line  Duration                  Peripheral IV - Single Lumen 06/01/24 2130 18 G Anterior;Left Forearm <1 day                  Significant Labs:  Recent Results (from the past  72 hour(s))   Ethanol    Collection Time: 06/01/24  6:47 PM   Result Value Ref Range    Ethanol Level <10.0 <=10.0 mg/dL   Troponin I    Collection Time: 06/01/24  6:47 PM   Result Value Ref Range    Troponin-I 0.308 (H) 0.000 - 0.045 ng/mL   Comprehensive Metabolic Panel    Collection Time: 06/01/24  6:47 PM   Result Value Ref Range    Sodium 144 136 - 145 mmol/L    Potassium 4.7 3.5 - 5.1 mmol/L    Chloride 110 (H) 98 - 107 mmol/L    CO2 20 (L) 23 - 31 mmol/L    Glucose 103 82 - 115 mg/dL    Blood Urea Nitrogen 19.6 8.4 - 25.7 mg/dL    Creatinine 0.91 0.73 - 1.18 mg/dL    Calcium 8.7 (L) 8.8 - 10.0 mg/dL    Protein Total 6.3 5.8 - 7.6 gm/dL    Albumin 3.2 (L) 3.4 - 4.8 g/dL    Globulin 3.1 2.4 - 3.5 gm/dL    Albumin/Globulin Ratio 1.0 (L) 1.1 - 2.0 ratio    Bilirubin Total 0.6 <=1.5 mg/dL    ALP 62 40 - 150 unit/L    ALT 11 0 - 55 unit/L    AST 18 5 - 34 unit/L    eGFR >60 mL/min/1.73/m2    Anion Gap 14.0 mEq/L    BUN/Creatinine Ratio 22    CBC with Differential    Collection Time: 06/01/24  6:47 PM   Result Value Ref Range    WBC 15.29 (H) 4.50 - 11.50 x10(3)/mcL    RBC 4.16 (L) 4.70 - 6.10 x10(6)/mcL    Hgb 12.9 (L) 14.0 - 18.0 g/dL    Hct 39.7 (L) 42.0 - 52.0 %    MCV 95.4 (H) 80.0 - 94.0 fL    MCH 31.0 27.0 - 31.0 pg    MCHC 32.5 (L) 33.0 - 36.0 g/dL    RDW 14.4 11.5 - 17.0 %    Platelet 236 130 - 400 x10(3)/mcL    MPV 9.9 7.4 - 10.4 fL    Neut % 82.3 %    Lymph % 7.2 %    Mono % 7.7 %    Eos % 1.5 %    Basophil % 0.4 %    Lymph # 1.10 0.6 - 4.6 x10(3)/mcL    Neut # 12.60 (H) 2.1 - 9.2 x10(3)/mcL    Mono # 1.17 0.1 - 1.3 x10(3)/mcL    Eos # 0.23 0 - 0.9 x10(3)/mcL    Baso # 0.06 <=0.2 x10(3)/mcL    IG# 0.13 (H) 0 - 0.04 x10(3)/mcL    IG% 0.9 %    NRBC% 0.0 %   Digoxin Level    Collection Time: 06/01/24  6:47 PM   Result Value Ref Range    Digoxin Level 1.0 0.8 - 2.0 ng/mL   Urinalysis, Reflex to Urine Culture    Collection Time: 06/01/24  8:48 PM    Specimen: Urine, Catheterized   Result Value Ref Range     Color, UA Light-Yellow Yellow, Light-Yellow, Colorless, Straw, Dark-Yellow    Appearance, UA Clear Clear    Specific Gravity, UA 1.019 1.005 - 1.030    pH, UA 6.5 5.0 - 8.5    Protein, UA Negative Negative    Glucose, UA Normal Negative, Normal    Ketones, UA Negative Negative    Blood, UA 1+ (A) Negative    Bilirubin, UA Negative Negative    Urobilinogen, UA 2.0 (A) 0.2, 1.0, Normal    Nitrites, UA Negative Negative    Leukocyte Esterase, UA Negative Negative    WBC, UA 0-5 None Seen, 0-2, 3-5, 0-5 /HPF    Bacteria, UA None Seen None Seen, Trace /HPF    Squamous Epithelial Cells, UA None Seen None Seen /HPF    Mucous, UA Trace (A) None Seen /LPF    RBC, UA 21-50 (A) None Seen, 0-2, 3-5, 0-5 /HPF   Troponin I    Collection Time: 06/01/24 11:13 PM   Result Value Ref Range    Troponin-I 0.645 (H) 0.000 - 0.045 ng/mL     Significant Imaging:  Imaging Results              X-Ray Chest 1 View (Final result)  Result time 06/01/24 22:46:44      Final result by Barber Moore MD (06/01/24 22:46:44)                   Impression:      Obscuring of both hemidiaphragms cannot rule out small effusions and/or atelectasis.      Electronically signed by: Barber Moore MD  Date:    06/01/2024  Time:    22:46               Narrative:    EXAMINATION:  XR CHEST 1 VIEW    CLINICAL HISTORY:  syncope;    TECHNIQUE:  Single frontal view of the chest was performed.    COMPARISON:  02/24/2021    FINDINGS:  There are faint increased markings in the right lung base with obscuring of the lateral portion of the right hemidiaphragm.  Cannot rule out small effusion.  The left hemidiaphragm is obscured.                                       CT Head Without Contrast (Final result)  Result time 06/02/24 07:50:02      Final result by Geronimo Vaughan MD (06/02/24 07:50:02)                   Impression:      No acute intracranial abnormality.    [facility] concordance      Electronically signed by: Geronimo Vaughan MD  Date:    06/02/2024  Time:    07:50                Narrative:    EXAMINATION:  CT HEAD WITHOUT CONTRAST    CLINICAL HISTORY:  Seizure, new-onset, no history of trauma;    TECHNIQUE:  Axial images of the head were obtained without IV contrast administration.  Coronal and sagittal reconstructions were provided.  Three dimensional and MIP images were obtained and evaluated.  Total DLP was 1097 mGy-cm. Dose lowering technique and automated exposure control were utilized for this exam.    COMPARISON:  CT of the head 07/09/2021.    FINDINGS:  There is normal brain formation.  There is extensive chronic encephalomalacia throughout the left MCA distribution as well as the right occipital lobe.  There is periventricular and subcortical white matter hypodensity.  There is no hemorrhage, hydrocephalus, or midline shift.  There is no cytotoxic or vasogenic edema.  There is no intra or extra-axial fluid collection.  There is no herniation.    The calvarium is intact.  There is no fracture.  The bilateral orbits are normal.  The paranasal sinuses and mastoid air cells are normally developed and free of disease.                        Preliminary result by Isaak Armijo MD (06/01/24 19:37:58)                   Impression:    1. No acute intracranial process identified. Details and other findings as noted above.               Narrative:    START OF REPORT:  Technique: CT of the head was performed without intravenous contrast with axial as well as coronal and sagittal images.    Comparison: Comparison is with study dated 2021-07-09 10:41:42.    Dosage Information: Automated exposure control was utilized.    Clinical history: Seizure like activity at NH with AMS, NH reports shaking everywhere for 3 min with eyes rolled back in head that did not respond to sternal rub. Cbg 104. initial gcs 10 with ems.    Findings:  Hemorrhage: No acute intracranial hemorrhage is seen.  CSF spaces: The ventricles, sulci and basal cisterns all appear mildly prominent consistent with global  cerebral atrophy. Additionally the ventricles appear dilated out of proportion to the sulci suggesting an element of concurrent non-obstructive hydrocephalus.  Brain parenchyma: No acute infarct is identified. Pronounced stable appearing microvascular change is seen in portions of the periventricular and deep white matter tracts. A small, stable appearing region of encephalomalacic change is again seen in the right occipital lobe. A large region of encephalomalacic change is seen involving the left middle cerebral artery territory, and is new compared to the previous study. These are consistent with chronic infarct.  Vascular territory infarcts:  Lacunar infarcts: There is a 4.7 mm lacunar infarct seen on Image 28, Series 2 in the right caudate head.  Cerebellum: Unremarkable.  Vascular: Pronounced stable appearing atheromatous calcification of the intracranial arteries is seen.  Sella and skull base: The sella appears to be within normal limits for age.  Cerebellopontine angles: Within normal limits.  Herniation: None.  Intracranial calcifications: Incidental note is made of bilateral choroid plexus calcification. Incidental note is made of some pineal region calcification.  Calvarium: No acute linear or depressed skull fracture is seen.    Maxillofacial Structures:  Paranasal sinuses: The visualized paranasal sinuses appear clear with no mucoperiosteal thickening or air fluid levels identified.  Orbits: The orbits appear unremarkable.  Zygomatic arches: The zygomatic arches are intact and unremarkable.  Temporal bones and mastoids: The temporal bones and mastoids appear unremarkable.  TMJ: The mandibular condyles appear normally placed with respect to the mandibular fossa.    Visualized upper cervical spine: The visualized cervical spine appears unremarkable.                                      EKG:       Telemetry:  AF    Physical Exam  Vitals reviewed.   Constitutional:       General: He is not in acute  distress.     Appearance: He is ill-appearing.   HENT:      Head: Normocephalic.   Cardiovascular:      Rate and Rhythm: Normal rate. Rhythm irregular.   Pulmonary:      Effort: Pulmonary effort is normal. No respiratory distress.   Musculoskeletal:      Cervical back: Neck supple.   Skin:     General: Skin is warm.   Neurological:      Mental Status: Mental status is at baseline.       Home Medications:   No current facility-administered medications on file prior to encounter.     Current Outpatient Medications on File Prior to Encounter   Medication Sig Dispense Refill    busPIRone (BUSPAR) 5 MG Tab Take 5 mg by mouth 2 (two) times daily.      carvediloL (COREG) 12.5 MG tablet Take 12.5 mg by mouth 2 (two) times daily.      digoxin (LANOXIN) 250 mcg tablet Take 250 mcg by mouth once daily.      donepeziL (ARICEPT) 10 MG tablet Take 10 mg by mouth every evening.      ELIQUIS 5 mg Tab Take 5 mg by mouth 2 (two) times daily.      ENTRESTO 24-26 mg per tablet Take 1 tablet by mouth 2 (two) times daily.      famotidine (PEPCID) 20 MG tablet Take 20 mg by mouth 2 (two) times daily.      memantine (NAMENDA) 10 MG Tab Take 10 mg by mouth 2 (two) times daily.      RESTASIS 0.05 % ophthalmic emulsion Place 1 drop into both eyes 2 (two) times daily.      gabapentin (NEURONTIN) 300 MG capsule Take 300 mg by mouth every evening.       Current Inpatient Medications:    Current Facility-Administered Medications:     levETIRAcetam tablet 500 mg, 500 mg, Oral, BID, Vasquez Eastman MD    LORazepam injection 0.5 mg, 0.5 mg, Intravenous, Q4H PRN, Vasquez Eastman MD    sodium chloride 0.9% flush 10 mL, 10 mL, Intravenous, PRN, Mónica Muro MD  VTE Risk Mitigation (From admission, onward)           Ordered     IP VTE HIGH RISK PATIENT  Once         06/01/24 2256     Place sequential compression device  Until discontinued         06/01/24 2256                  Assessment:   NSTEMI- Suspect Type II in the Setting of Seizure  Like Activity  Persistent AF- Now AF CVR    - On Eliquis  Hypertension  Dyslipidemia  Possible Seizure    - CT Head Negative for Acute Intracranial Abnormality  LVSD    - EF 35%  Possible Prostate Cancer (Per Family Report)  Leukocytosis  Anemia  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   Dementia  Peripheral Neuropathy    - On Neurontin   DNR Status     Plan:   Resume Home Cardiac Medications including Eliquis for Stroke Risk Reduction  Check Echocardiogram  Neuro Workup in Progress  Will follow     Thank you for your consult.     MOUNA Gibbons  Cardiology  [facility] - Neurology  06/02/2024        Physician addendum:  I have seen and examined this patient as a split-shared visit with the JOAO d/t complicated medical management of above problems written in assessment and high acuity requiring physician expertise in medical decision-making. I performed the substantive portion of the history and exam. Above medical decision-making is also formulated by me.    Cardiovascular exam:  S1, S2  Lungs:  fine crackles at bases.  Extremities:  1+ edema bilaterally    Plan:   Follow up echocardiogram.  Resume home Medications as above.  Continue supportive therapy.  We will follow up.      Augie Mcgill MD  Cardiologist

## 2024-06-02 NOTE — NURSING
Due to patient being poor historian and family cannot provide pertinent medical history, Dr. Eastman was notified that he would need to sign a form for the MRI to be done on the above patient. Dr. Eastman stated he will sign the form tomorrow 06/03/24. MRI Tech notified.

## 2024-06-02 NOTE — ASSESSMENT & PLAN NOTE
Presented to ED with possible seizure activity and syncope     CT head: Negative       Plan   -Seizure precautions   -MRI brain when able, further recommendations after MRI  -Continue to follow exam   -He is a DNR   -Continue Keppra 500 mg b.i.d.  -continue Eliquis  -Could consider EEG if further seizure activity is suspected

## 2024-06-02 NOTE — HPI
83-year-old male with a past medical history of HTN, HLD, PAF (on Eliquis), and stroke with residual right-sided weakness) presented to ED on 06/14 seizure and syncope.  He resides at a nursing home where it was reported that he had about 3 minutes of not responding and possible seizure activity.  Per EMR, family reported he is at baseline nonverbal but will occasionally say yes.  He had a witnessed seizure in ED and was found to be postictal directly after.  Upon arrival to ED, /76.  CT head was negative for acute intracranial abnormalities.  Neurology was consulted for seizure-like activity and syncope.

## 2024-06-02 NOTE — SUBJECTIVE & OBJECTIVE
History reviewed. No pertinent past medical history.    History reviewed. No pertinent surgical history.    Review of patient's allergies indicates:   Allergen Reactions    Penicillins        Current Neurological Medications:     No current facility-administered medications on file prior to encounter.     Current Outpatient Medications on File Prior to Encounter   Medication Sig    busPIRone (BUSPAR) 5 MG Tab Take 5 mg by mouth 2 (two) times daily.    carvediloL (COREG) 12.5 MG tablet Take 12.5 mg by mouth 2 (two) times daily.    digoxin (LANOXIN) 250 mcg tablet Take 250 mcg by mouth once daily.    donepeziL (ARICEPT) 10 MG tablet Take 10 mg by mouth every evening.    ELIQUIS 5 mg Tab Take 5 mg by mouth 2 (two) times daily.    ENTRESTO 24-26 mg per tablet Take 1 tablet by mouth 2 (two) times daily.    famotidine (PEPCID) 20 MG tablet Take 20 mg by mouth 2 (two) times daily.    memantine (NAMENDA) 10 MG Tab Take 10 mg by mouth 2 (two) times daily.    RESTASIS 0.05 % ophthalmic emulsion Place 1 drop into both eyes 2 (two) times daily.    gabapentin (NEURONTIN) 300 MG capsule Take 300 mg by mouth every evening.     Family History    None       Tobacco Use    Smoking status: Not on file    Smokeless tobacco: Not on file   Substance and Sexual Activity    Alcohol use: Not on file    Drug use: Not on file    Sexual activity: Not on file     Review of Systems   Unable to perform ROS: Acuity of condition     Objective:     Vital Signs (Most Recent):  Temp: 98.2 °F (36.8 °C) (06/02/24 0753)  Pulse: 76 (06/02/24 0753)  Resp: 18 (06/02/24 0407)  BP: (!) 154/59 (06/02/24 0753)  SpO2: 98 % (06/02/24 0407) Vital Signs (24h Range):  Temp:  [97.7 °F (36.5 °C)-98.2 °F (36.8 °C)] 98.2 °F (36.8 °C)  Pulse:  [63-82] 76  Resp:  [16-19] 18  SpO2:  [96 %-98 %] 98 %  BP: (138-164)/(59-78) 154/59     Weight: 66.3 kg (146 lb 2.6 oz)  Body mass index is 22.89 kg/m².     Physical Exam  Vitals and nursing note reviewed.   Constitutional:        General: He is not in acute distress.     Appearance: He is not toxic-appearing.   HENT:      Head: Normocephalic.   Eyes:      Pupils: Pupils are equal, round, and reactive to light.   Pulmonary:      Effort: Pulmonary effort is normal.   Musculoskeletal:         General: Normal range of motion.      Right lower leg: No edema.      Left lower leg: No edema.   Skin:     General: Skin is warm and dry.      Capillary Refill: Capillary refill takes less than 2 seconds.   Neurological:      Mental Status: He is alert.      Comments:     Limited PE  -does not participate in exam  -appears to track and look to right and left, no gaze preference  -no obvious seizure activity  -PERR  -appears to reposition himself in the bed from laying on his right side to laying on his back  -nonverbal during exam           NEUROLOGICAL EXAMINATION:     CRANIAL NERVES     CN III, IV, VI   Pupils are equal, round, and reactive to light.      Significant Labs:   Recent Lab Results  (Last 5 results in the past 24 hours)        06/02/24  0846   06/02/24  0845   06/01/24  2313   06/01/24  2048   06/01/24  1847        Albumin/Globulin Ratio         1.0       Albumin         3.2       Alcohol, Serum         <10.0  Comment: This assay is performed for medical purposes only.       ALP         62       ALT         11       Anion Gap 13.0         14.0       Appearance, UA       Clear         AST         18       Bacteria, UA       None Seen         Baso #   0.04       0.06       Basophil %   0.3       0.4       Bilirubin (UA)       Negative         BILIRUBIN TOTAL         0.6       BUN 16.6         19.6       BUN/CREAT RATIO 20         22       Calcium 8.3         8.7       Chloride 112         110       CO2 17         20       Color, UA       Light-Yellow         Creatinine 0.85         0.91       Digoxin Level         1.0       eGFR >60         >60       Eos #   0.05       0.23       Eos %   0.4       1.5       Globulin, Total         3.1        Glucose 90         103       Glucose, UA       Normal         Hematocrit   38.3       39.7       Hemoglobin   12.4       12.9       Immature Grans (Abs)   0.08       0.13       Immature Granulocytes   0.7       0.9       Ketones, UA       Negative         Leukocyte Esterase, UA       Negative         Lymph #   1.50       1.10       LYMPH %   13.0       7.2       MCH   31.4       31.0       MCHC   32.4       32.5       MCV   97.0       95.4       Mono #   1.34       1.17       Mono %   11.6       7.7       MPV   11.5       9.9       Mucous, UA       Trace         Neut #   8.57       12.60       Neut %   74.0       82.3       NITRITE UA       Negative         nRBC   0.0       0.0       Blood, UA       1+         pH, UA       6.5         Platelet Count   171       236       Potassium 4.9         4.7       PROTEIN TOTAL         6.3       Protein, UA       Negative         RBC   3.95       4.16       RBC, UA       21-50         RDW   14.6       14.4       Sodium 142         144       Specific Gravity,UA       1.019         Squamous Epithelial Cells, UA       None Seen         Troponin I     0.645  Comment: Troponin failed Delta Check, make courtesy call to provider     0.308       Urobilinogen, UA       2.0         WBC, UA       0-5         WBC   11.58       15.29                              Significant Imaging: I have reviewed all pertinent imaging results/findings within the past 24 hours.

## 2024-06-02 NOTE — CONSULTS
[facility]  Neurology  Consult Note    Patient Name: Bandar Medeiros  MRN: [id number]  Admission Date: 6/1/2024  Hospital Length of Stay: 0 days  Code Status: DNR   Attending Provider: Vasquez Eastman MD   Consulting Provider: Erica Jack NP  Primary Care Physician: Natasha, Primary Doctor  Principal Problem:Seizure    Inpatient consult to Neurology  Consult performed by: Erica Jack NP  Consult ordered by: Vasquez Eastman MD         Subjective:       HPI:   83-year-old male with a past medical history of HTN, HLD, PAF (on Eliquis), and stroke with residual right-sided weakness) presented to ED on 06/14 seizure and syncope.  He resides at a nursing home where it was reported that he had about 3 minutes of not responding and possible seizure activity.  Per EMR, family reported he is at baseline nonverbal but will occasionally say yes.  He had a witnessed seizure in ED and was found to be postictal directly after.  Upon arrival to ED, /76.  CT head was negative for acute intracranial abnormalities.  Neurology was consulted for seizure-like activity and syncope.     History reviewed. No pertinent past medical history.    History reviewed. No pertinent surgical history.    Review of patient's allergies indicates:   Allergen Reactions    Penicillins        Current Neurological Medications:     No current facility-administered medications on file prior to encounter.     Current Outpatient Medications on File Prior to Encounter   Medication Sig    busPIRone (BUSPAR) 5 MG Tab Take 5 mg by mouth 2 (two) times daily.    carvediloL (COREG) 12.5 MG tablet Take 12.5 mg by mouth 2 (two) times daily.    digoxin (LANOXIN) 250 mcg tablet Take 250 mcg by mouth once daily.    donepeziL (ARICEPT) 10 MG tablet Take 10 mg by mouth every evening.    ELIQUIS 5 mg Tab Take 5 mg by mouth 2 (two) times daily.    ENTRESTO 24-26 mg per tablet Take 1 tablet by mouth 2 (two) times daily.    famotidine  (PEPCID) 20 MG tablet Take 20 mg by mouth 2 (two) times daily.    memantine (NAMENDA) 10 MG Tab Take 10 mg by mouth 2 (two) times daily.    RESTASIS 0.05 % ophthalmic emulsion Place 1 drop into both eyes 2 (two) times daily.    gabapentin (NEURONTIN) 300 MG capsule Take 300 mg by mouth every evening.     Family History    None       Tobacco Use    Smoking status: Not on file    Smokeless tobacco: Not on file   Substance and Sexual Activity    Alcohol use: Not on file    Drug use: Not on file    Sexual activity: Not on file     Review of Systems   Unable to perform ROS: Acuity of condition     Objective:     Vital Signs (Most Recent):  Temp: 98.2 °F (36.8 °C) (06/02/24 0753)  Pulse: 76 (06/02/24 0753)  Resp: 18 (06/02/24 0407)  BP: (!) 154/59 (06/02/24 0753)  SpO2: 98 % (06/02/24 0407) Vital Signs (24h Range):  Temp:  [97.7 °F (36.5 °C)-98.2 °F (36.8 °C)] 98.2 °F (36.8 °C)  Pulse:  [63-82] 76  Resp:  [16-19] 18  SpO2:  [96 %-98 %] 98 %  BP: (138-164)/(59-78) 154/59     Weight: 66.3 kg (146 lb 2.6 oz)  Body mass index is 22.89 kg/m².     Physical Exam  Vitals and nursing note reviewed.   Constitutional:       General: He is not in acute distress.     Appearance: He is not toxic-appearing.   HENT:      Head: Normocephalic.   Eyes:      Pupils: Pupils are equal, round, and reactive to light.   Pulmonary:      Effort: Pulmonary effort is normal.   Musculoskeletal:         General: Normal range of motion.      Right lower leg: No edema.      Left lower leg: No edema.   Skin:     General: Skin is warm and dry.      Capillary Refill: Capillary refill takes less than 2 seconds.   Neurological:      Mental Status: He is alert.      Comments:     Limited PE  -does not participate in exam  -appears to track and look to right and left, no gaze preference  -no obvious seizure activity  -PERR  -appears to reposition himself in the bed from laying on his right side to laying on his back  -nonverbal during exam            NEUROLOGICAL EXAMINATION:     CRANIAL NERVES     CN III, IV, VI   Pupils are equal, round, and reactive to light.      Significant Labs:   Recent Lab Results  (Last 5 results in the past 24 hours)        06/02/24  0846   06/02/24  0845   06/01/24  2313   06/01/24 2048   06/01/24  1847        Albumin/Globulin Ratio         1.0       Albumin         3.2       Alcohol, Serum         <10.0  Comment: This assay is performed for medical purposes only.       ALP         62       ALT         11       Anion Gap 13.0         14.0       Appearance, UA       Clear         AST         18       Bacteria, UA       None Seen         Baso #   0.04       0.06       Basophil %   0.3       0.4       Bilirubin (UA)       Negative         BILIRUBIN TOTAL         0.6       BUN 16.6         19.6       BUN/CREAT RATIO 20         22       Calcium 8.3         8.7       Chloride 112         110       CO2 17         20       Color, UA       Light-Yellow         Creatinine 0.85         0.91       Digoxin Level         1.0       eGFR >60         >60       Eos #   0.05       0.23       Eos %   0.4       1.5       Globulin, Total         3.1       Glucose 90         103       Glucose, UA       Normal         Hematocrit   38.3       39.7       Hemoglobin   12.4       12.9       Immature Grans (Abs)   0.08       0.13       Immature Granulocytes   0.7       0.9       Ketones, UA       Negative         Leukocyte Esterase, UA       Negative         Lymph #   1.50       1.10       LYMPH %   13.0       7.2       MCH   31.4       31.0       MCHC   32.4       32.5       MCV   97.0       95.4       Mono #   1.34       1.17       Mono %   11.6       7.7       MPV   11.5       9.9       Mucous, UA       Trace         Neut #   8.57       12.60       Neut %   74.0       82.3       NITRITE UA       Negative         nRBC   0.0       0.0       Blood, UA       1+         pH, UA       6.5         Platelet Count   171       236       Potassium 4.9         4.7        PROTEIN TOTAL         6.3       Protein, UA       Negative         RBC   3.95       4.16       RBC, UA       21-50         RDW   14.6       14.4       Sodium 142         144       Specific Gravity,UA       1.019         Squamous Epithelial Cells, UA       None Seen         Troponin I     0.645  Comment: Troponin failed Delta Check, make courtesy call to provider     0.308       Urobilinogen, UA       2.0         WBC, UA       0-5         WBC   11.58       15.29                              Significant Imaging: I have reviewed all pertinent imaging results/findings within the past 24 hours.  Assessment and Plan:     * Seizure  Presented to ED with possible seizure activity and syncope     CT head: Negative       Plan   -Seizure precautions   -MRI brain when able, further recommendations after MRI  -Continue to follow exam   -He is a DNR   -Continue Keppra 500 mg b.i.d.  -continue Eliquis  -Could consider EEG if further seizure activity is suspected        VTE Risk Mitigation (From admission, onward)           Ordered     Place CHRISTIAN hose  Until discontinued         06/02/24 0939     IP VTE HIGH RISK PATIENT  Once         06/02/24 0939     Place sequential compression device  Until discontinued         06/01/24 3127                    Further recommendations to follow from MD Erica Jack, NP  Neurology  [facility] - Neurology    I have seen/examined the patient.  NP was scribe.  I agree with the findings unless outlined below:    Adam N Foreman, MD [facility]     Pt seen/examined  Large LMCA encephalomalacia    Present with sz    Exam  Asleep    Recs  Stop aricept  Continue keppra  No need for eeg; his risk for subsequent seizure is 100% off meds  Signing off

## 2024-06-02 NOTE — PT/OT/SLP PROGRESS
SLP attempting clinical swallowing evaluation, however staring blankly and pt unable to follow commands. PO intake is unsafe at this time. SLP to continue to follow and treat as appropriate.

## 2024-06-02 NOTE — PROGRESS NOTES
Contacted by nursing because patient was more difficult to arouse and pupils were not reactive.        Assessed patient, he did appear to be a little more lethargic than earlier in the day but took about the same amount of stimulation to get him to open his eyes. Pupils are brisk.  He did not talk to me earlier and did not during this assessment either but moved extremities spontaneously and resisted having his eyes opened.      Discussed with Dr. Ozuna that patient is possibly post-ictal.  -can increase Keppra from 500 mg BID to 1g BID  -load with an extra 500 mg Keppra now   -he does have a Large area of encephalomalacia in the left MCA territory that would significantly increase his risk of seizures

## 2024-06-02 NOTE — NURSING
Nurses Note -- 4 Eyes      6/2/2024   12:40 AM      Skin assessed during: Admit      [] No Altered Skin Integrity Present    [x]Prevention Measures Documented      [x] Yes- Altered Skin Integrity Present or Discovered   [] LDA Added if Not in Epic (Describe Wound)   [] New Altered Skin Integrity was Present on Admit and Documented in LDA   [x] Wound Image Taken    Wound Care Consulted? No    Attending Nurse:  Marce Montes RN    Second RN/Staff Member:  Airam Carlson LPN

## 2024-06-02 NOTE — PLAN OF CARE
Problem: Adult Inpatient Plan of Care  Goal: Plan of Care Review  Outcome: Progressing  Flowsheets (Taken 6/2/2024 0800)  Plan of Care Reviewed With: patient  Goal: Patient-Specific Goal (Individualized)  Outcome: Progressing  Goal: Absence of Hospital-Acquired Illness or Injury  Outcome: Progressing  Intervention: Identify and Manage Fall Risk  Flowsheets (Taken 6/2/2024 0800)  Safety Promotion/Fall Prevention: (bedrails padded)   assistive device/personal item within reach   bed alarm set   instructed to call staff for mobility   Fall Risk signage in place   medications reviewed   side rails raised x 3   other (see comments)  Intervention: Prevent Skin Injury  Flowsheets (Taken 6/2/2024 0800)  Body Position: turned  Skin Protection: incontinence pads utilized  Intervention: Prevent and Manage VTE (Venous Thromboembolism) Risk  Flowsheets (Taken 6/2/2024 0800)  VTE Prevention/Management: remove, assess skin, and reapply sequential compression device  Intervention: Prevent Infection  Flowsheets (Taken 6/2/2024 0800)  Infection Prevention:   environmental surveillance performed   single patient room provided  Goal: Optimal Comfort and Wellbeing  Outcome: Progressing  Intervention: Monitor Pain and Promote Comfort  Flowsheets (Taken 6/2/2024 0800)  Pain Management Interventions: care clustered  Goal: Readiness for Transition of Care  Outcome: Progressing     Problem: Skin Injury Risk Increased  Goal: Skin Health and Integrity  Outcome: Progressing  Intervention: Optimize Skin Protection  Flowsheets (Taken 6/2/2024 0800)  Pressure Reduction Techniques: pressure points protected  Skin Protection: incontinence pads utilized  Activity Management: Rolling - L1  Head of Bed (HOB) Positioning: HOB at 30-45 degrees     Problem: Infection  Goal: Absence of Infection Signs and Symptoms  Outcome: Progressing     Problem: Stroke, Ischemic (Includes Transient Ischemic Attack)  Goal: Optimal Coping  Outcome: Progressing  Goal:  Effective Bowel Elimination  Outcome: Progressing  Goal: Optimal Cerebral Tissue Perfusion  Outcome: Progressing  Goal: Optimal Cognitive Function  Outcome: Progressing  Goal: Improved Communication Skills  Outcome: Progressing  Intervention: Optimize Communication Skills  Flowsheets (Taken 6/2/2024 0800)  Communication Enhancement Strategies:   call light answered in person   nonverbal strategies used  Goal: Optimal Functional Ability  Outcome: Progressing  Intervention: Optimize Functional Ability  Flowsheets (Taken 6/2/2024 0800)  Activity Management: Rolling - L1  Goal: Optimal Nutrition Intake  Outcome: Progressing  Goal: Effective Oxygenation and Ventilation  Outcome: Progressing  Intervention: Optimize Oxygenation and Ventilation  Flowsheets (Taken 6/2/2024 0800)  Head of Bed (HOB) Positioning: HOB at 30-45 degrees  Goal: Improved Sensorimotor Function  Outcome: Progressing  Intervention: Optimize Sensory and Perceptual Ability  Flowsheets (Taken 6/2/2024 0800)  Pressure Reduction Techniques: pressure points protected  Goal: Safe and Effective Swallow  Outcome: Progressing  Goal: Effective Urinary Elimination  Outcome: Progressing

## 2024-06-03 LAB
APTT PPP: 36.5 SECONDS (ref 23.2–33.7)
INR PPP: 1.2
PROTHROMBIN TIME: 14.7 SECONDS (ref 12.5–14.5)

## 2024-06-03 PROCEDURE — 36415 COLL VENOUS BLD VENIPUNCTURE: CPT | Performed by: INTERNAL MEDICINE

## 2024-06-03 PROCEDURE — 63600175 PHARM REV CODE 636 W HCPCS: Performed by: INTERNAL MEDICINE

## 2024-06-03 PROCEDURE — 21400001 HC TELEMETRY ROOM

## 2024-06-03 PROCEDURE — 25000003 PHARM REV CODE 250: Performed by: INTERNAL MEDICINE

## 2024-06-03 PROCEDURE — 92610 EVALUATE SWALLOWING FUNCTION: CPT

## 2024-06-03 PROCEDURE — 85610 PROTHROMBIN TIME: CPT | Performed by: INTERNAL MEDICINE

## 2024-06-03 PROCEDURE — 99231 SBSQ HOSP IP/OBS SF/LOW 25: CPT | Mod: ,,, | Performed by: PSYCHIATRY & NEUROLOGY

## 2024-06-03 PROCEDURE — 85730 THROMBOPLASTIN TIME PARTIAL: CPT | Performed by: INTERNAL MEDICINE

## 2024-06-03 PROCEDURE — 63600175 PHARM REV CODE 636 W HCPCS: Performed by: NURSE PRACTITIONER

## 2024-06-03 RX ORDER — LEVETIRACETAM 10 MG/ML
1000 INJECTION INTRAVASCULAR EVERY 12 HOURS
Status: DISCONTINUED | OUTPATIENT
Start: 2024-06-03 | End: 2024-06-10 | Stop reason: HOSPADM

## 2024-06-03 RX ADMIN — LEVETIRACETAM INJECTION 1000 MG: 10 INJECTION INTRAVENOUS at 08:06

## 2024-06-03 RX ADMIN — LEUCINE, PHENYLALANINE, LYSINE, METHIONINE, ISOLEUCINE, VALINE, HISTIDINE, THREONINE, TRYPTOPHAN, ALANINE, GLYCINE, ARGININE, PROLINE, SERINE, TYROSINE, SODIUM ACETATE, DIBASIC POTASSIUM PHOSPHATE, MAGNESIUM CHLORIDE, SODIUM CHLORIDE, CALCIUM CHLORIDE, DEXTROSE
311; 238; 247; 170; 255; 247; 204; 179; 77; 880; 438; 489; 289; 213; 17; 297; 261; 51; 77; 33; 5 INJECTION INTRAVENOUS at 03:06

## 2024-06-03 RX ADMIN — LEVETIRACETAM 1000 MG: 100 INJECTION, SOLUTION INTRAVENOUS at 09:06

## 2024-06-03 NOTE — PLAN OF CARE
Problem: Adult Inpatient Plan of Care  Goal: Plan of Care Review  6/3/2024 0718 by Marce Montes RN  Outcome: Progressing  6/3/2024 0718 by Marce Montes RN  Outcome: Progressing  Goal: Patient-Specific Goal (Individualized)  6/3/2024 0718 by Marce Montes RN  Outcome: Progressing  6/3/2024 0718 by Marce Montes RN  Outcome: Progressing  Goal: Absence of Hospital-Acquired Illness or Injury  6/3/2024 0718 by Marce Montes RN  Outcome: Progressing  6/3/2024 0718 by Marce Montes RN  Outcome: Progressing  Goal: Optimal Comfort and Wellbeing  6/3/2024 0718 by Marce Montes RN  Outcome: Progressing  6/3/2024 0718 by Marce Montes RN  Outcome: Progressing  Goal: Readiness for Transition of Care  6/3/2024 0718 by Marce Montes RN  Outcome: Progressing  6/3/2024 0718 by Marce Montes RN  Outcome: Progressing     Problem: Skin Injury Risk Increased  Goal: Skin Health and Integrity  6/3/2024 0718 by Marce Montes RN  Outcome: Progressing  6/3/2024 0718 by Marce Montes RN  Outcome: Progressing     Problem: Infection  Goal: Absence of Infection Signs and Symptoms  6/3/2024 0718 by Marce Montes RN  Outcome: Progressing  6/3/2024 0718 by Marce Montes RN  Outcome: Progressing     Problem: Stroke, Ischemic (Includes Transient Ischemic Attack)  Goal: Optimal Coping  6/3/2024 0718 by Marce Montes RN  Outcome: Progressing  6/3/2024 0718 by Marce Montes RN  Outcome: Progressing  Goal: Effective Bowel Elimination  6/3/2024 0718 by Marce Montes RN  Outcome: Progressing  6/3/2024 0718 by Marce Montes RN  Outcome: Progressing  Goal: Optimal Cerebral Tissue Perfusion  6/3/2024 0718 by Marce Montes RN  Outcome: Progressing  6/3/2024 0718 by Marce Montes RN  Outcome: Progressing  Goal: Optimal Cognitive Function  6/3/2024 0718 by Marce Montes RN  Outcome: Progressing  6/3/2024 0718 by Marce Montes,  RN  Outcome: Progressing  Goal: Improved Communication Skills  6/3/2024 0718 by Marce Montes RN  Outcome: Progressing  6/3/2024 0718 by Marce Montes RN  Outcome: Progressing  Goal: Optimal Functional Ability  6/3/2024 0718 by Marce Montes RN  Outcome: Progressing  6/3/2024 0718 by Marce Montes RN  Outcome: Progressing  Goal: Optimal Nutrition Intake  6/3/2024 0718 by Marce Montes RN  Outcome: Progressing  6/3/2024 0718 by Marce Montes RN  Outcome: Progressing  Goal: Effective Oxygenation and Ventilation  6/3/2024 0718 by Marce Montes RN  Outcome: Progressing  6/3/2024 0718 by Marce Montes RN  Outcome: Progressing  Goal: Improved Sensorimotor Function  6/3/2024 0718 by Marce Montes RN  Outcome: Progressing  6/3/2024 0718 by Marce Montes RN  Outcome: Progressing  Goal: Safe and Effective Swallow  6/3/2024 0718 by Marce Montes RN  Outcome: Progressing  6/3/2024 0718 by Marce Montes RN  Outcome: Progressing  Goal: Effective Urinary Elimination  6/3/2024 0718 by Marce Montes RN  Outcome: Progressing  6/3/2024 0718 by Marce Montes RN  Outcome: Progressing

## 2024-06-03 NOTE — PROGRESS NOTES
Consults  [facility] - Neurology    Cardiology  Consult Note    Patient Name: Bandar Medeiros  MRN: [id number]  Admission Date: 6/1/2024  Hospital Length of Stay: 1 days  Code Status: DNR   Attending Provider: Vasquez Eastman MD   Consulting Provider: Lucinda Cummins MD  Primary Care Physician: Natasha, Primary Doctor  Principal Problem:Seizure    Patient information was obtained from past medical records, ER records, and primary team.     Subjective:   Chief Complaint/Reason for Consult: Elevated Troponin    HPI:   Mr. Medeiros is a 83 year old male, known to Dr. Thompson (2021), who presented to the hospital from local nursing home due to persistent shaking and unconsciousness (eyes rolled back with no response to sternal rub). Patient at baseline speaks very little, history of CVA with chronic right sided deficits. Troponin noted to be 0.3 with trend up to 0.6. Leukocytosis noted. CT Head negative for acute intracranial abnormality. Chest XR revealed Obscuring of both hemidiaphragms cannot rule out small effusions and/or atelectasis. EKG revealed AF SVR with HR High 50's. [facility] consulted given abnormal troponin value.    6.3.2024: Patient non verbal with minimal response to verbal and tactile stimuli.     PMH: OA, Hypertension, Dyslipidemia, SVT, PAF (Eliquis), LVSD  PSH: Appendectomy  Family History: Father- Cancer/CAD, Mother- CAD, Sister- Hypertension  Social History: Tobacco- Negative, Alcohol- Negative, Substance Abuse- Negative    Previous Cardiac Diagnostics:   Echocardiogram 6.2.2024  -EF 50 55%, unable to assess diastolic fluctuance secondary to atrial fibrillation.    Moderate aortic regurgitation   Mild to moderate regurgitation mitral valve  Tricuspid Valve: There is moderate regurgitation. The estimated PA systolic pressure is at least 34 mmHg.     Echocardiogram (8.23.21):  Normal left ventricular size with moderately depressed ventricular   function with an estimated ejection fraction of 35 to  40%   Left ventricular hypertrophy   Left atrial enlargement   Moderate mitral regurgitation   Moderate tricuspid regurgitation with an estimated PA systolic pressure of   48 mmHg   Negative bubble study     Carotid US (5.14.21):  ARACELI/LICA < 50% Stenosis- No Hemodynamically Significant Stenoses.  Patent Antegrade Flow of Bilateral Vertebral Arteries    Review of patient's allergies indicates:   Allergen Reactions    Penicillins      No current facility-administered medications on file prior to encounter.     Current Outpatient Medications on File Prior to Encounter   Medication Sig    busPIRone (BUSPAR) 5 MG Tab Take 5 mg by mouth 2 (two) times daily.    carvediloL (COREG) 12.5 MG tablet Take 12.5 mg by mouth 2 (two) times daily.    digoxin (LANOXIN) 250 mcg tablet Take 250 mcg by mouth once daily.    donepeziL (ARICEPT) 10 MG tablet Take 10 mg by mouth every evening.    ELIQUIS 5 mg Tab Take 5 mg by mouth 2 (two) times daily.    ENTRESTO 24-26 mg per tablet Take 1 tablet by mouth 2 (two) times daily.    famotidine (PEPCID) 20 MG tablet Take 20 mg by mouth 2 (two) times daily.    memantine (NAMENDA) 10 MG Tab Take 10 mg by mouth 2 (two) times daily.    RESTASIS 0.05 % ophthalmic emulsion Place 1 drop into both eyes 2 (two) times daily.    gabapentin (NEURONTIN) 300 MG capsule Take 300 mg by mouth every evening.     Review of Systems   Unable to perform ROS: Patient nonverbal     Objective:     Vital Signs (Most Recent):  Temp: 97.6 °F (36.4 °C) (06/03/24 0807)  Pulse: 78 (06/03/24 0352)  Resp: 18 (06/02/24 1630)  BP: (!) 169/83 (06/03/24 0807)  SpO2: (!) 81 % (06/03/24 0807) Vital Signs (24h Range):  Temp:  [97.5 °F (36.4 °C)-98.7 °F (37.1 °C)] 97.6 °F (36.4 °C)  Pulse:  [41-78] 78  Resp:  [18] 18  SpO2:  [81 %-100 %] 81 %  BP: (161-184)/(63-83) 169/83   Weight: 66.3 kg (146 lb 2.6 oz)  Body mass index is 22.89 kg/m².  SpO2: (!) 81 %     No intake or output data in the 24 hours ending 06/03/24  0918  Lines/Drains/Airways       Peripheral Intravenous Line  Duration                  Peripheral IV - Single Lumen 06/01/24 2130 18 G Anterior;Left Forearm 1 day                  Significant Labs:  Recent Results (from the past 72 hour(s))   EKG 12-lead    Collection Time: 06/01/24  6:22 PM   Result Value Ref Range    QRS Duration 74 ms    [facility] QTC Calculation 409 ms   Ethanol    Collection Time: 06/01/24  6:47 PM   Result Value Ref Range    Ethanol Level <10.0 <=10.0 mg/dL   Troponin I    Collection Time: 06/01/24  6:47 PM   Result Value Ref Range    Troponin-I 0.308 (H) 0.000 - 0.045 ng/mL   Comprehensive Metabolic Panel    Collection Time: 06/01/24  6:47 PM   Result Value Ref Range    Sodium 144 136 - 145 mmol/L    Potassium 4.7 3.5 - 5.1 mmol/L    Chloride 110 (H) 98 - 107 mmol/L    CO2 20 (L) 23 - 31 mmol/L    Glucose 103 82 - 115 mg/dL    Blood Urea Nitrogen 19.6 8.4 - 25.7 mg/dL    Creatinine 0.91 0.73 - 1.18 mg/dL    Calcium 8.7 (L) 8.8 - 10.0 mg/dL    Protein Total 6.3 5.8 - 7.6 gm/dL    Albumin 3.2 (L) 3.4 - 4.8 g/dL    Globulin 3.1 2.4 - 3.5 gm/dL    Albumin/Globulin Ratio 1.0 (L) 1.1 - 2.0 ratio    Bilirubin Total 0.6 <=1.5 mg/dL    ALP 62 40 - 150 unit/L    ALT 11 0 - 55 unit/L    AST 18 5 - 34 unit/L    eGFR >60 mL/min/1.73/m2    Anion Gap 14.0 mEq/L    BUN/Creatinine Ratio 22    CBC with Differential    Collection Time: 06/01/24  6:47 PM   Result Value Ref Range    WBC 15.29 (H) 4.50 - 11.50 x10(3)/mcL    RBC 4.16 (L) 4.70 - 6.10 x10(6)/mcL    Hgb 12.9 (L) 14.0 - 18.0 g/dL    Hct 39.7 (L) 42.0 - 52.0 %    MCV 95.4 (H) 80.0 - 94.0 fL    MCH 31.0 27.0 - 31.0 pg    MCHC 32.5 (L) 33.0 - 36.0 g/dL    RDW 14.4 11.5 - 17.0 %    Platelet 236 130 - 400 x10(3)/mcL    MPV 9.9 7.4 - 10.4 fL    Neut % 82.3 %    Lymph % 7.2 %    Mono % 7.7 %    Eos % 1.5 %    Basophil % 0.4 %    Lymph # 1.10 0.6 - 4.6 x10(3)/mcL    Neut # 12.60 (H) 2.1 - 9.2 x10(3)/mcL    Mono # 1.17 0.1 - 1.3 x10(3)/mcL    Eos # 0.23 0 - 0.9  x10(3)/mcL    Baso # 0.06 <=0.2 x10(3)/mcL    IG# 0.13 (H) 0 - 0.04 x10(3)/mcL    IG% 0.9 %    NRBC% 0.0 %   Digoxin Level    Collection Time: 06/01/24  6:47 PM   Result Value Ref Range    Digoxin Level 1.0 0.8 - 2.0 ng/mL   Urinalysis, Reflex to Urine Culture    Collection Time: 06/01/24  8:48 PM    Specimen: Urine, Catheterized   Result Value Ref Range    Color, UA Light-Yellow Yellow, Light-Yellow, Colorless, Straw, Dark-Yellow    Appearance, UA Clear Clear    Specific Gravity, UA 1.019 1.005 - 1.030    pH, UA 6.5 5.0 - 8.5    Protein, UA Negative Negative    Glucose, UA Normal Negative, Normal    Ketones, UA Negative Negative    Blood, UA 1+ (A) Negative    Bilirubin, UA Negative Negative    Urobilinogen, UA 2.0 (A) 0.2, 1.0, Normal    Nitrites, UA Negative Negative    Leukocyte Esterase, UA Negative Negative    WBC, UA 0-5 None Seen, 0-2, 3-5, 0-5 /HPF    Bacteria, UA None Seen None Seen, Trace /HPF    Squamous Epithelial Cells, UA None Seen None Seen /HPF    Mucous, UA Trace (A) None Seen /LPF    RBC, UA 21-50 (A) None Seen, 0-2, 3-5, 0-5 /HPF   Troponin I    Collection Time: 06/01/24 11:13 PM   Result Value Ref Range    Troponin-I 0.645 (H) 0.000 - 0.045 ng/mL   CBC with Differential    Collection Time: 06/02/24  8:45 AM   Result Value Ref Range    WBC 11.58 (H) 4.50 - 11.50 x10(3)/mcL    RBC 3.95 (L) 4.70 - 6.10 x10(6)/mcL    Hgb 12.4 (L) 14.0 - 18.0 g/dL    Hct 38.3 (L) 42.0 - 52.0 %    MCV 97.0 (H) 80.0 - 94.0 fL    MCH 31.4 (H) 27.0 - 31.0 pg    MCHC 32.4 (L) 33.0 - 36.0 g/dL    RDW 14.6 11.5 - 17.0 %    Platelet 171 130 - 400 x10(3)/mcL    MPV 11.5 (H) 7.4 - 10.4 fL    Neut % 74.0 %    Lymph % 13.0 %    Mono % 11.6 %    Eos % 0.4 %    Basophil % 0.3 %    Lymph # 1.50 0.6 - 4.6 x10(3)/mcL    Neut # 8.57 2.1 - 9.2 x10(3)/mcL    Mono # 1.34 (H) 0.1 - 1.3 x10(3)/mcL    Eos # 0.05 0 - 0.9 x10(3)/mcL    Baso # 0.04 <=0.2 x10(3)/mcL    IG# 0.08 (H) 0 - 0.04 x10(3)/mcL    IG% 0.7 %    NRBC% 0.0 %   Basic  metabolic panel    Collection Time: 06/02/24  8:46 AM   Result Value Ref Range    Sodium 142 136 - 145 mmol/L    Potassium 4.9 3.5 - 5.1 mmol/L    Chloride 112 (H) 98 - 107 mmol/L    CO2 17 (L) 23 - 31 mmol/L    Glucose 90 82 - 115 mg/dL    Blood Urea Nitrogen 16.6 8.4 - 25.7 mg/dL    Creatinine 0.85 0.73 - 1.18 mg/dL    BUN/Creatinine Ratio 20     Calcium 8.3 (L) 8.8 - 10.0 mg/dL    Anion Gap 13.0 mEq/L    eGFR >60 mL/min/1.73/m2   Troponin I    Collection Time: 06/02/24 11:28 AM   Result Value Ref Range    Troponin-I 0.317 (H) 0.000 - 0.045 ng/mL   EKG 12-lead    Collection Time: 06/02/24  2:49 PM   Result Value Ref Range    QRS Duration 80 ms    [facility] QTC Calculation 388 ms   Echo    Collection Time: 06/02/24  4:50 PM   Result Value Ref Range    BSA 1.77 m2    LVOT stroke volume 69.93 cm3    LVIDd 5.19 3.5 - 6.0 cm    LV Systolic Volume 58.10 mL    LV Systolic Volume Index 32.8 mL/m2    LVIDs 3.70 2.1 - 4.0 cm    LV Diastolic Volume 129.00 mL    LV Diastolic Volume Index 72.88 mL/m2    IVS 0.94 0.6 - 1.1 cm    LVOT diameter 2.10 cm    LVOT area 3.5 cm2    FS 29 28 - 44 %    Left Ventricle Relative Wall Thickness 0.37 cm    Posterior Wall 0.96 0.6 - 1.1 cm    LV mass 180.81 g    LV Mass Index 102 g/m2    MV Peak E Cade 1.22 m/s    TR Max Cade 2.91 m/s    LVOT peak cade 0.78 m/s    Left Ventricular Outflow Tract Mean Velocity 0.52 cm/s    Left Ventricular Outflow Tract Mean Gradient 1.00 mmHg    RVDD 2.87 cm    TAPSE 1.60 cm    RV/LV Ratio 0.55 cm    LA size 3.90 cm    RA Major Axis 5.16 cm    RA Width 4.02 cm    AV regurgitation pressure 1/2 time 431 ms    AR Max Cade 3.71 m/s    AV mean gradient 5 mmHg    AV peak gradient 8 mmHg    Ao peak cade 1.42 m/s    Ao VTI 36.90 cm    LVOT peak VTI 20.20 cm    AV valve area 1.90 cm²    AV Velocity Ratio 0.55     AV index (prosthetic) 0.55     RALPH by Velocity Ratio 1.90 cm²    MV mean gradient 2 mmHg    MV peak gradient 6 mmHg    MV valve area by continuity eq 2.88 cm2    MV VTI  24.3 cm    Triscuspid Valve Regurgitation Peak Gradient 34 mmHg    ZLVIDS 1.59     ZLVIDD 0.59     TV resting pulmonary artery pressure 37 mmHg    RV TB RVSP 6 mmHg    Est. RA pres 3 mmHg   APTT    Collection Time: 06/03/24  7:32 AM   Result Value Ref Range    PTT 36.5 (H) 23.2 - 33.7 seconds   Protime-INR    Collection Time: 06/03/24  7:32 AM   Result Value Ref Range    PT 14.7 (H) 12.5 - 14.5 seconds    INR 1.2 <=1.3     Significant Imaging:  Imaging Results              X-Ray Chest 1 View (Final result)  Result time 06/01/24 22:46:44      Final result by Barber Moore MD (06/01/24 22:46:44)                   Impression:      Obscuring of both hemidiaphragms cannot rule out small effusions and/or atelectasis.      Electronically signed by: Barber Moore MD  Date:    06/01/2024  Time:    22:46               Narrative:    EXAMINATION:  XR CHEST 1 VIEW    CLINICAL HISTORY:  syncope;    TECHNIQUE:  Single frontal view of the chest was performed.    COMPARISON:  02/24/2021    FINDINGS:  There are faint increased markings in the right lung base with obscuring of the lateral portion of the right hemidiaphragm.  Cannot rule out small effusion.  The left hemidiaphragm is obscured.                                       CT Head Without Contrast (Final result)  Result time 06/02/24 07:50:02      Final result by Geronimo Vaughan MD (06/02/24 07:50:02)                   Impression:      No acute intracranial abnormality.    [facility] concordance      Electronically signed by: Geronimo Vaughan MD  Date:    06/02/2024  Time:    07:50               Narrative:    EXAMINATION:  CT HEAD WITHOUT CONTRAST    CLINICAL HISTORY:  Seizure, new-onset, no history of trauma;    TECHNIQUE:  Axial images of the head were obtained without IV contrast administration.  Coronal and sagittal reconstructions were provided.  Three dimensional and MIP images were obtained and evaluated.  Total DLP was 1097 mGy-cm. Dose lowering technique and automated  exposure control were utilized for this exam.    COMPARISON:  CT of the head 07/09/2021.    FINDINGS:  There is normal brain formation.  There is extensive chronic encephalomalacia throughout the left MCA distribution as well as the right occipital lobe.  There is periventricular and subcortical white matter hypodensity.  There is no hemorrhage, hydrocephalus, or midline shift.  There is no cytotoxic or vasogenic edema.  There is no intra or extra-axial fluid collection.  There is no herniation.    The calvarium is intact.  There is no fracture.  The bilateral orbits are normal.  The paranasal sinuses and mastoid air cells are normally developed and free of disease.                        Preliminary result by Isaak Armijo MD (06/01/24 19:37:58)                   Impression:    1. No acute intracranial process identified. Details and other findings as noted above.               Narrative:    START OF REPORT:  Technique: CT of the head was performed without intravenous contrast with axial as well as coronal and sagittal images.    Comparison: Comparison is with study dated 2021-07-09 10:41:42.    Dosage Information: Automated exposure control was utilized.    Clinical history: Seizure like activity at NH with AMS, NH reports shaking everywhere for 3 min with eyes rolled back in head that did not respond to sternal rub. Cbg 104. initial gcs 10 with ems.    Findings:  Hemorrhage: No acute intracranial hemorrhage is seen.  CSF spaces: The ventricles, sulci and basal cisterns all appear mildly prominent consistent with global cerebral atrophy. Additionally the ventricles appear dilated out of proportion to the sulci suggesting an element of concurrent non-obstructive hydrocephalus.  Brain parenchyma: No acute infarct is identified. Pronounced stable appearing microvascular change is seen in portions of the periventricular and deep white matter tracts. A small, stable appearing region of encephalomalacic change is  again seen in the right occipital lobe. A large region of encephalomalacic change is seen involving the left middle cerebral artery territory, and is new compared to the previous study. These are consistent with chronic infarct.  Vascular territory infarcts:  Lacunar infarcts: There is a 4.7 mm lacunar infarct seen on Image 28, Series 2 in the right caudate head.  Cerebellum: Unremarkable.  Vascular: Pronounced stable appearing atheromatous calcification of the intracranial arteries is seen.  Sella and skull base: The sella appears to be within normal limits for age.  Cerebellopontine angles: Within normal limits.  Herniation: None.  Intracranial calcifications: Incidental note is made of bilateral choroid plexus calcification. Incidental note is made of some pineal region calcification.  Calvarium: No acute linear or depressed skull fracture is seen.    Maxillofacial Structures:  Paranasal sinuses: The visualized paranasal sinuses appear clear with no mucoperiosteal thickening or air fluid levels identified.  Orbits: The orbits appear unremarkable.  Zygomatic arches: The zygomatic arches are intact and unremarkable.  Temporal bones and mastoids: The temporal bones and mastoids appear unremarkable.  TMJ: The mandibular condyles appear normally placed with respect to the mandibular fossa.    Visualized upper cervical spine: The visualized cervical spine appears unremarkable.                                      EKG:       Telemetry:  AF    Physical Exam  Vitals reviewed.   Constitutional:       General: He is not in acute distress.     Appearance: He is ill-appearing.   HENT:      Head: Normocephalic.   Cardiovascular:      Rate and Rhythm: Normal rate. Rhythm irregular.   Pulmonary:      Effort: Pulmonary effort is normal. No respiratory distress.   Musculoskeletal:      Cervical back: Neck supple.   Skin:     General: Skin is warm.   Neurological:      Mental Status: Mental status is at baseline.       Home  Medications:   No current facility-administered medications on file prior to encounter.     Current Outpatient Medications on File Prior to Encounter   Medication Sig Dispense Refill    busPIRone (BUSPAR) 5 MG Tab Take 5 mg by mouth 2 (two) times daily.      carvediloL (COREG) 12.5 MG tablet Take 12.5 mg by mouth 2 (two) times daily.      digoxin (LANOXIN) 250 mcg tablet Take 250 mcg by mouth once daily.      donepeziL (ARICEPT) 10 MG tablet Take 10 mg by mouth every evening.      ELIQUIS 5 mg Tab Take 5 mg by mouth 2 (two) times daily.      ENTRESTO 24-26 mg per tablet Take 1 tablet by mouth 2 (two) times daily.      famotidine (PEPCID) 20 MG tablet Take 20 mg by mouth 2 (two) times daily.      memantine (NAMENDA) 10 MG Tab Take 10 mg by mouth 2 (two) times daily.      RESTASIS 0.05 % ophthalmic emulsion Place 1 drop into both eyes 2 (two) times daily.      gabapentin (NEURONTIN) 300 MG capsule Take 300 mg by mouth every evening.       Current Inpatient Medications:    Current Facility-Administered Medications:     Amino acid 4.25% - dextrose 5% (CLINIMIX-E) solution (1L provides 42.5 gm AA, 50 gm CHO (170 kcal/L dextrose), Na 35, K 30, Mg 5, Ca 4.5, Acetate 70, Cl 39, Phos 15), , Intravenous, Continuous, Vasquez Eastman MD, Last Rate: 75 mL/hr at 06/02/24 1833, New Bag at 06/02/24 1833    bisacodyL suppository 10 mg, 10 mg, Rectal, Daily PRN, Vasquez Eastman MD    labetaloL injection 10 mg, 10 mg, Intravenous, Q15 Min PRN, Vasquez Eastman MD    levETIRAcetam injection 1,000 mg, 1,000 mg, Intravenous, Q12H, Erica Jack NP, 1,000 mg at 06/03/24 0905    LORazepam injection 0.5 mg, 0.5 mg, Intravenous, Q4H PRN, Vasquez Eastman MD    sodium chloride 0.9% flush 10 mL, 10 mL, Intravenous, PRN, Mónica Muro MD  VTE Risk Mitigation (From admission, onward)           Ordered     Place CHRISTIAN hose  Until discontinued         06/02/24 0939     IP VTE HIGH RISK PATIENT  Once         06/02/24 0939      Place sequential compression device  Until discontinued         06/01/24 5209                  Assessment:   NSTEMI- Suspect Type II in the Setting of Seizure Like Activity  Persistent AF- Now AF CVR    - On Eliquis  Hypertension  Dyslipidemia  Possible Seizure    - CT Head Negative for Acute Intracranial Abnormality  LVSD, improved    - EF 35%->50-55% 6/2024  Moderate aortic reguritation  Possible Prostate Cancer (Per Family Report)  Leukocytosis  Anemia  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   Dementia  Peripheral Neuropathy    - On Neurontin   DNR Status     Plan:   Echo shows no RWMA  Aortic regurgitation recommend restarting patient's ARNI  Please obtain outpatient stress test    We will sign off       Attending physician note  I have rounded with medical resident. I personally reviewed case related differential diagnoses, assessment and plan. A thorough physical examination noted above is performed by me. I have personally reviewed the labs, test results and medication lists that are currently available.  See above MDM formulated by me (Augie Mcgill MD):     All of the patient's and/or family's questions have been addressed and answered to the best of my ability.

## 2024-06-03 NOTE — PLAN OF CARE
Problem: Adult Inpatient Plan of Care  Goal: Plan of Care Review  Outcome: Progressing  Goal: Patient-Specific Goal (Individualized)  Outcome: Progressing  Goal: Absence of Hospital-Acquired Illness or Injury  Outcome: Progressing  Goal: Optimal Comfort and Wellbeing  Outcome: Progressing  Goal: Readiness for Transition of Care  Outcome: Progressing     Problem: Skin Injury Risk Increased  Goal: Skin Health and Integrity  Outcome: Progressing     Problem: Infection  Goal: Absence of Infection Signs and Symptoms  Outcome: Progressing     Problem: Stroke, Ischemic (Includes Transient Ischemic Attack)  Goal: Optimal Coping  Outcome: Progressing  Goal: Effective Bowel Elimination  Outcome: Progressing  Goal: Optimal Cerebral Tissue Perfusion  Outcome: Progressing  Goal: Optimal Cognitive Function  Outcome: Progressing  Goal: Improved Communication Skills  Outcome: Progressing  Goal: Optimal Functional Ability  Outcome: Progressing  Goal: Optimal Nutrition Intake  Outcome: Progressing  Goal: Effective Oxygenation and Ventilation  Outcome: Progressing  Goal: Improved Sensorimotor Function  Outcome: Progressing  Goal: Safe and Effective Swallow  Outcome: Progressing  Goal: Effective Urinary Elimination  Outcome: Progressing

## 2024-06-03 NOTE — PT/OT/SLP PROGRESS
Physical Therapy      Patient Name:  Bandar Medeiros   MRN:  [id number]    PT orders received. Contacted pt's facility of residence ([facility]) this morning via phone. PLOF is dependent for all mobility and ADLs, diana lift t/f, resistant to therapy and has not received therapy services there in some time. Not appropriate for acute PT services, will sign off at this time.

## 2024-06-03 NOTE — PT/OT/SLP EVAL
Per NH, pt is a resident there, does not receive therapy, as he is resistant to OT/PT, dependent for all ADLs and hoyers to chair at baseline. OT signing off- pt at baseline.

## 2024-06-03 NOTE — ASSESSMENT & PLAN NOTE
Plan   -Seizure precautions   -MRI brain ordered by primary, proceed when able  -Continue to follow exam   -He is a DNR   -Continue Keppra 1 mg b.i.d.  -continue Eliquis  -Could consider EEG if further seizure activity is suspected

## 2024-06-03 NOTE — SUBJECTIVE & OBJECTIVE
Subjective:     Interval History: No further seizure like episodes. NAEON per nursing staff    Current Neurological Medications: Keppra 1000 mg IV BID    Current Facility-Administered Medications   Medication Dose Route Frequency Provider Last Rate Last Admin    Amino acid 4.25% - dextrose 5% (CLINIMIX-E) solution (1L provides 42.5 gm AA, 50 gm CHO (170 kcal/L dextrose), Na 35, K 30, Mg 5, Ca 4.5, Acetate 70, Cl 39, Phos 15)   Intravenous Continuous Vasquez Eastman MD 75 mL/hr at 06/02/24 1833 New Bag at 06/02/24 1833    bisacodyL suppository 10 mg  10 mg Rectal Daily PRN Vasquez Eastman MD        labetaloL injection 10 mg  10 mg Intravenous Q15 Min PRN Vasquez Eastman MD        levETIRAcetam injection 1,000 mg  1,000 mg Intravenous Q12H Marcie, Erica W, NP   1,000 mg at 06/03/24 0905    LORazepam injection 0.5 mg  0.5 mg Intravenous Q4H PRN Vasquez Eastman MD        sodium chloride 0.9% flush 10 mL  10 mL Intravenous PRN Mónica Muro MD           Review of Systems   Unable to perform ROS: Acuity of condition     Objective:     Vital Signs (Most Recent):  Temp: 97.6 °F (36.4 °C) (06/03/24 0807)  Pulse: 78 (06/03/24 0352)  Resp: 18 (06/02/24 1630)  BP: (!) 169/83 (06/03/24 0807)  SpO2: (!) 81 % (06/03/24 0807) Vital Signs (24h Range):  Temp:  [97.5 °F (36.4 °C)-98.7 °F (37.1 °C)] 97.6 °F (36.4 °C)  Pulse:  [41-78] 78  Resp:  [18] 18  SpO2:  [81 %-100 %] 81 %  BP: (161-184)/(63-83) 169/83     Weight: 66.3 kg (146 lb 2.6 oz)  Body mass index is 22.89 kg/m².     Physical Exam  Constitutional:       General: He is not in acute distress.     Appearance: He is normal weight.   Eyes:      Conjunctiva/sclera: Conjunctivae normal.   Neurological:      Mental Status: He is alert.      Cranial Nerves: No cranial nerve deficit.               Significant Labs:   Recent Lab Results         06/03/24  0732   06/02/24  1650   06/02/24  1449        Ao peak royal   1.42         Ao VTI   36.90         PTT  36.5  Comment: For Minimal Heparin Infusion, the goal aPTT 64-85 seconds corresponds to an anti-Xa of 0.3-0.5.    For Low Intensity and High Intensity Heparin, the goal aPTT  seconds corresponds to an anti-Xa of 0.3-0.7           AR Max Cade   3.71         AV valve area   1.90         RALPH by Velocity Ratio   1.90         AV mean gradient   5         AV index (prosthetic)   0.55         AV peak gradient   8         AV regurgitation pressure 1/2 time   431         AV Velocity Ratio   0.55         BSA   1.77         Left Ventricle Relative Wall Thickness   0.37         FS   29         INR 1.2           IVSd   0.94         LA size   3.90         LVOT area   3.5         LV EDV BP   129.00         LV Diastolic Volume Index   72.88         LVIDd   5.19         LVIDs   3.70         LV mass   180.81         LV Mass Index   102         Left Ventricular Outflow Tract Mean Gradient   1.00         Left Ventricular Outflow Tract Mean Velocity   0.52         LVOT diameter   2.10         LVOT peak cade   0.78         LVOT stroke volume   69.93         LVOT peak VTI   20.20         LV ESV BP   58.10         LV Systolic Volume Index   32.8         MV valve area by continuity eq   2.88         MV mean gradient   2         MV peak gradient   6         MV Peak E Cade   1.22         MV VTI   24.3         QRS Duration     80       OHS QTC Calculation     388       PT 14.7           Posterior Wall   0.96         RA Major Axis   5.16         Est. RA pres   3         RA Width   4.02         RV TB RVSP   6         RV/LV Ratio   0.55         RVDD   2.87         TAPSE   1.60         Triscuspid Valve Regurgitation Peak Gradient   34         TR Max Cade   2.91         TV resting pulmonary artery pressure   37         ZLVIDD   0.59         ZLVIDS   1.59                 Significant Imaging:  No new imaging

## 2024-06-03 NOTE — PT/OT/SLP EVAL
[facility]  Speech Language Pathology Department  Clinical Swallow Evaluation    Patient Name:  Bandar Medeiros   MRN:  [id number]    Recommendations     General recommendations:  SLP follow up x1 for diet tolerance  Diet texture/consistency recommendations:  Puree solids (IDDSI 4) and mildly thick liquids (IDDSI 2)  Medications: crushed in puree  Swallow strategies/precautions: small bites/sips, slow rate, upright for PO intake, and assist with feeding as needed  Precautions: Standard, fall    History     Bandar Medeiros is an 83-year-old male presents to the ER for evaluation following seizure-like activity and syncope. Patient is a resident at a nursing facility.     History reviewed. No pertinent past medical history.  History reviewed. No pertinent surgical history.    Home diet texture/consistency: Puree and mildly thick liquids  Current method of nutrition: NPO    Imaging   Results for orders placed during the hospital encounter of 06/01/24    X-Ray Chest 1 View    Narrative  EXAMINATION:  XR CHEST 1 VIEW    CLINICAL HISTORY:  syncope;    TECHNIQUE:  Single frontal view of the chest was performed.    COMPARISON:  02/24/2021    FINDINGS:  There are faint increased markings in the right lung base with obscuring of the lateral portion of the right hemidiaphragm.  Cannot rule out small effusion.  The left hemidiaphragm is obscured.    Impression  Obscuring of both hemidiaphragms cannot rule out small effusions and/or atelectasis.      Electronically signed by: Barber Moore MD  Date:    06/01/2024  Time:    22:46    No results found for this or any previous visit.    No results found for this or any previous visit.    Subjective     Patient awake, alert, and cooperative.    Spiritual/Cultural/Anglican Beliefs/Practices that affect care: no    Pain/Comfort: Pain Rating 1: 0/10    Objective     ORAL MUSCULATURE  Dentition: own teeth  Secretion Management: adequate    Consistency Fed By Oral  Symptoms Pharyngeal Symptoms   Mildly thick liquid by spoon SLP None None   Mildly thick liquid by straw SLP None None   Puree SLP None None     Assessment     Resume baseline PO diet. ST to follow up x1 for diet tolerance.    Goals     Multidisciplinary Problems       SLP Goals       Not on file                  Education     Patient provided with verbal education regarding ST POC.  Additional teaching is warranted.    Plan     SLP Follow-Up:  Yes   Plan of Care reviewed with:  patient     Time Tracking     SLP Treatment Date:   06/03/24  Speech Start Time:  0945  Speech Stop Time:  1005     Speech Total Time (min):  20 min    Billable minutes:  Swallow and Oral Function Evaluation, 20 minutes     06/03/2024

## 2024-06-03 NOTE — PROGRESS NOTES
[facility] - Neurology Hospital Medicine  Progress Note    Patient Name: Bandar Medeiros  MRN: [id number]  Patient Class: IP- Inpatient   Admission Date: 6/1/2024  Length of Stay: 1 days  Attending Physician: Vasquez Eastman MD  Primary Care Provider: Natasha, Primary Doctor        Subjective:     Principal Problem:Seizure    Interval History:  The patient seen by Neurology and Keppra has been increased to 1 g IV b.i.d..  Seems a little bit more alert today he actually open his eyes when I spoke but he could not move make eye contact with me he had a upper left localized gaze patient both eyes look into his left upper corner.  MRI had to be signed since we can not contact any family members.    Review of Systems   Unable to perform ROS: Acuity of condition     Objective:     Vital Signs (Most Recent):  Temp: 97.9 °F (36.6 °C) (06/03/24 0352)  Pulse: 78 (06/03/24 0352)  Resp: 18 (06/02/24 1630)  BP: (!) 161/76 (06/03/24 0352)  SpO2: 100 % (06/03/24 0352) Vital Signs (24h Range):  Temp:  [97.5 °F (36.4 °C)-98.7 °F (37.1 °C)] 97.9 °F (36.6 °C)  Pulse:  [41-78] 78  Resp:  [18] 18  SpO2:  [96 %-100 %] 100 %  BP: (154-184)/(59-77) 161/76     Weight: 66.3 kg (146 lb 2.6 oz)  Body mass index is 22.89 kg/m².  No intake or output data in the 24 hours ending 06/03/24 0652   Physical Exam  HENT:      Head: Normocephalic and atraumatic.      Left Ear: External ear normal.   Cardiovascular:      Rate and Rhythm: Normal rate and regular rhythm.      Pulses: Normal pulses.      Heart sounds: Normal heart sounds.   Pulmonary:      Effort: Pulmonary effort is normal.      Breath sounds: Normal breath sounds.   Abdominal:      General: Bowel sounds are normal.      Palpations: Abdomen is soft.   Musculoskeletal:      Cervical back: Neck supple.   Skin:     General: Skin is warm and dry.   Neurological:      Mental Status: He is alert.      Comments: Still can not be properly interrogated.           Overview/Hospital  Course:  Patient at the moment in PPN.  I will try to get an MRI.  Continue with DVT prophylaxis.  Repeat some blood work for the morning.  Condition guarded.    Significant Labs: All pertinent labs within the past 24 hours have been reviewed.  BMP:   Recent Labs   Lab 06/02/24  0846      K 4.9   *   CO2 17*   BUN 16.6   CREATININE 0.85   CALCIUM 8.3*     CBC:   Recent Labs   Lab 06/01/24  1847 06/02/24  0845   WBC 15.29* 11.58*   HGB 12.9* 12.4*   HCT 39.7* 38.3*    171       Significant Imaging: I have reviewed all pertinent imaging results/findings within the past 24 hours.    Assessment/Plan:      Active Diagnoses:    Diagnosis Date Noted POA    PRINCIPAL PROBLEM:  Seizure [R56.9] 06/02/2024 Unknown      Problems Resolved During this Admission:     VTE Risk Mitigation (From admission, onward)           Ordered     Place CHRISTIAN hose  Until discontinued         06/02/24 0939     IP VTE HIGH RISK PATIENT  Once         06/02/24 0939     Place sequential compression device  Until discontinued         06/01/24 4955                       Vaqsuez Eastman MD  Department of Hospital Medicine   [facility] - Neurology

## 2024-06-03 NOTE — PROGRESS NOTES
[facility] - Neurology  Neurology  Progress Note    Patient Name: Bandar Medeiros  MRN: [id number]  Admission Date: 6/1/2024  Hospital Length of Stay: 1 days  Code Status: DNR   Attending Provider: Vasquez Eastman MD  Primary Care Physician: Natasha, Primary Doctor   Principal Problem:Seizure    HPI:   83-year-old male with a past medical history of HTN, HLD, PAF (on Eliquis), and stroke with residual right-sided weakness) presented to ED on 06/14 seizure and syncope.  He resides at a nursing home where it was reported that he had about 3 minutes of not responding and possible seizure activity.  Per EMR, family reported he is at baseline nonverbal but will occasionally say yes.  He had a witnessed seizure in ED and was found to be postictal directly after.  Upon arrival to ED, /76.  CT head was negative for acute intracranial abnormalities.  Neurology was consulted for seizure-like activity and syncope.    Overview/Hospital Course:  No notes on file        Subjective:     Interval History: No further seizure like episodes. NAEON per nursing staff    Current Neurological Medications: Keppra 1000 mg IV BID    Current Facility-Administered Medications   Medication Dose Route Frequency Provider Last Rate Last Admin    Amino acid 4.25% - dextrose 5% (CLINIMIX-E) solution (1L provides 42.5 gm AA, 50 gm CHO (170 kcal/L dextrose), Na 35, K 30, Mg 5, Ca 4.5, Acetate 70, Cl 39, Phos 15)   Intravenous Continuous Vasquez Eastman MD 75 mL/hr at 06/02/24 1833 New Bag at 06/02/24 1833    bisacodyL suppository 10 mg  10 mg Rectal Daily PRN Vasquez Eastman MD        labetaloL injection 10 mg  10 mg Intravenous Q15 Min PRN Vasquez Eastman MD        levETIRAcetam injection 1,000 mg  1,000 mg Intravenous Q12H Erica Jack NP   1,000 mg at 06/03/24 0905    LORazepam injection 0.5 mg  0.5 mg Intravenous Q4H PRN Vasquez Eastman MD        sodium chloride 0.9% flush 10 mL  10 mL Intravenous PRN Bhaskar  Mónica MARQUEZ MD           Review of Systems   Unable to perform ROS: Acuity of condition     Objective:     Vital Signs (Most Recent):  Temp: 97.6 °F (36.4 °C) (06/03/24 0807)  Pulse: 78 (06/03/24 0352)  Resp: 18 (06/02/24 1630)  BP: (!) 169/83 (06/03/24 0807)  SpO2: (!) 81 % (06/03/24 0807) Vital Signs (24h Range):  Temp:  [97.5 °F (36.4 °C)-98.7 °F (37.1 °C)] 97.6 °F (36.4 °C)  Pulse:  [41-78] 78  Resp:  [18] 18  SpO2:  [81 %-100 %] 81 %  BP: (161-184)/(63-83) 169/83     Weight: 66.3 kg (146 lb 2.6 oz)  Body mass index is 22.89 kg/m².     Physical Exam  Constitutional:       General: He is not in acute distress.     Appearance: He is normal weight.   Eyes:      Conjunctiva/sclera: Conjunctivae normal.   Neurological:      Mental Status: He is alert.      Cranial Nerves: No cranial nerve deficit.               Significant Labs:   Recent Lab Results         06/03/24  0732   06/02/24  1650   06/02/24  1449        Ao peak cade   1.42         Ao VTI   36.90         PTT 36.5  Comment: For Minimal Heparin Infusion, the goal aPTT 64-85 seconds corresponds to an anti-Xa of 0.3-0.5.    For Low Intensity and High Intensity Heparin, the goal aPTT  seconds corresponds to an anti-Xa of 0.3-0.7           AR Max Cade   3.71         AV valve area   1.90         RALPH by Velocity Ratio   1.90         AV mean gradient   5         AV index (prosthetic)   0.55         AV peak gradient   8         AV regurgitation pressure 1/2 time   431         AV Velocity Ratio   0.55         BSA   1.77         Left Ventricle Relative Wall Thickness   0.37         FS   29         INR 1.2           IVSd   0.94         LA size   3.90         LVOT area   3.5         LV EDV BP   129.00         LV Diastolic Volume Index   72.88         LVIDd   5.19         LVIDs   3.70         LV mass   180.81         LV Mass Index   102         Left Ventricular Outflow Tract Mean Gradient   1.00         Left Ventricular Outflow Tract Mean Velocity   0.52         LVOT  diameter   2.10         LVOT peak cade   0.78         LVOT stroke volume   69.93         LVOT peak VTI   20.20         LV ESV BP   58.10         LV Systolic Volume Index   32.8         MV valve area by continuity eq   2.88         MV mean gradient   2         MV peak gradient   6         MV Peak E Cade   1.22         MV VTI   24.3         QRS Duration     80       OHS QTC Calculation     388       PT 14.7           Posterior Wall   0.96         RA Major Axis   5.16         Est. RA pres   3         RA Width   4.02         RV TB RVSP   6         RV/LV Ratio   0.55         RVDD   2.87         TAPSE   1.60         Triscuspid Valve Regurgitation Peak Gradient   34         TR Max Cade   2.91         TV resting pulmonary artery pressure   37         ZLVIDD   0.59         ZLVIDS   1.59                 Significant Imaging:  No new imaging  Assessment and Plan:     * Seizure    Plan   -Seizure precautions   -MRI brain ordered by primary, proceed when able  -Continue to follow exam   -He is a DNR   -Continue Keppra 1 mg b.i.d.  -continue Eliquis  -Could consider EEG if further seizure activity is suspected        VTE Risk Mitigation (From admission, onward)           Ordered     Place CHRISTIAN hose  Until discontinued         06/02/24 0939     IP VTE HIGH RISK PATIENT  Once         06/02/24 0939     Place sequential compression device  Until discontinued         06/01/24 3742                    Jamin Valdez MD  Neurology  [facility] - Neu[facility]    I was present with the resident during the history and exam.    [x ] I discussed the case with the resident and agree with the findings and plan as documented in the resident's note.  [ ] I discussed the case with the resident and agree with the findings and plan as documented in the resident's note except:    Dane Ozuna MD  [facility]     He is awake today  Aphasic  Alert    Continue keppra 1 g bid  Signing off

## 2024-06-03 NOTE — PLAN OF CARE
06/03/24 1757   Discharge Assessment   Assessment Type Discharge Planning Assessment   Confirmed/corrected address, phone number and insurance Yes   Confirmed Demographics Correct on Facesheet   Source of Information health record   When was your last doctors appointment?   (Doctor at [facility])   Communicated LIN with patient/caregiver Date not available/Unable to determine   Reason For Admission Elevated Troponin, Syncope, Seizure   [facility] facility resident   Facility Arrived From: [facility]   Do you expect to return to your current living situation? Yes   Do you have help at home or someone to help you manage your care at home?   (staff at the facility)   Prior to hospitilization cognitive status: Unable to Assess   Current cognitive status: Alert/Oriented  (patient is alert)   Walking or Climbing Stairs Difficulty no   Dressing/Bathing Difficulty no  (staff at [facility])   Do you have any problems with:   (Provided by facility)   Home Accessibility wheelchair accessible   Home Layout Able to live on 1st floor   Equipment Currently Used at Home   (Resident at [facility])   Readmission within 30 days? No   Patient currently being followed by outpatient case management? No   Do you currently have service(s) that help you manage your care at home? Yes   Name and Contact number of agency Resident at [facility]   Is the pt/caregiver preference to resume services with current agency Yes   Do you take prescription medications?   (provided by facility)   Do you have prescription coverage?   (facility)   Do you have any problems affording any of your prescribed medications?   (provided by facility)   Who is going to help you get home at discharge? facility van or [facility]   How do you get to doctors appointments?   (facility van)   Are you on dialysis? No   Discharge Plan A Return to nursing home   Discharge Plan B Return to Nursing Home   OTHER   Name(s) of [facility] staff  and residents of facility

## 2024-06-03 NOTE — PROGRESS NOTES
Inpatient Nutrition Assessment    Admit Date: 6/1/2024   Total duration of encounter: 2 days   Patient Age: 83 y.o.    Nutrition Recommendation/Prescription     Continue oral diet texture modifications as recommended by SLP, currently: puree solids + mildly thick liquids.   Add Boost Very High Calorie (provides 530 kcal, 22 g protein per serving) BID for additional nutrition. Mildly thick appropriate.   Discontinue PPN once current bag is complete and pt eating >60% meals.     Communication of Recommendations: reviewed with nurse    Nutrition Assessment     Malnutrition Assessment/Nutrition-Focused Physical Exam    Malnutrition Context: acute illness or injury (06/03/24 1418)  Malnutrition Level:  (unable to determine at this time) (06/03/24 1418)                                                        A minimum of two characteristics is recommended for diagnosis of either severe or non-severe malnutrition.    Chart Review    Reason Seen: continuous nutrition monitoring and malnutrition screening tool (MST)    Malnutrition Screening Tool Results   Have you recently lost weight without trying?: Unsure  Have you been eating poorly because of a decreased appetite?: No   MST Score: 2   Diagnosis:  NSTEMI- Suspect Type II in the Setting of Seizure Like Activity  Persistent AF- Now AF CVR    - On Eliquis  Hypertension  Dyslipidemia  Possible Seizure    - CT Head Negative for Acute Intracranial Abnormality  LVSD, improved    - EF 35%->50-55% 6/2024  Moderate aortic reguritation  Possible Prostate Cancer (Per Family Report)  Leukocytosis  Anemia  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   Dementia  Peripheral Neuropathy    - On Neurontin   DNR Status     Relevant Medical History: OA, Hypertension, Dyslipidemia, SVT, PAF (Eliquis), LVSD     Scheduled Medications:  levETIRAcetam (Keppra) IV (PEDS and ADULTS), 1,000 mg, Q12H    Continuous Infusions:  Amino acid 4.25% - dextrose 5% (CLINIMIX-E)  "solution (1L provides 42.5 gm AA, 50 gm CHO (170 kcal/L dextrose), Na 35, K 30, Mg 5, Ca 4.5, Acetate 70, Cl 39, Phos 15), Last Rate: 75 mL/hr at 06/02/24 1833    PRN Medications:reviewed  Calorie Containing IV Medications: no significant kcals from medications at this time and Clinimix    Recent Labs   Lab 06/01/24  1847 06/02/24  0845 06/02/24  0846     --  142   K 4.7  --  4.9   CALCIUM 8.7*  --  8.3*   CO2 20*  --  17*   BUN 19.6  --  16.6   CREATININE 0.91  --  0.85   EGFRNORACEVR >60  --  >60   GLUCOSE 103  --  90   BILITOT 0.6  --   --    ALKPHOS 62  --   --    ALT 11  --   --    AST 18  --   --    ALBUMIN 3.2*  --   --    WBC 15.29* 11.58*  --    HGB 12.9* 12.4*  --    HCT 39.7* 38.3*  --      Nutrition Orders:  Amino acid 4.25% - dextrose 5% (CLINIMIX-E) solution (1L provides 42.5 gm AA, 50 gm CHO (170 kcal/L dextrose), Na 35, K 30, Mg 5, Ca 4.5, Acetate 70, Cl 39, Phos 15)  Diet Pureed (IDDSI Level 4) Mildly Thick Liquids (IDDSI Level 2)      Appetite/Oral Intake: NPO/NPO  Factors Affecting Nutritional Intake: impaired cognitive status/motor control and NPO  Social Needs Impacting Access to Food: unable to assess at this time; will attempt on follow-up  Food/Synagogue/Cultural Preferences: none reported  Food Allergies: none reported  Last Bowel Movement: 06/01/24  Wound(s):      Comments    6/3/24 Pt getting bathed during rounds, spoke to RN. Pt NPO since admit (x2 days) started on PPN with electrolytes by MD. SLP evaluated this morning and cleared for puree solids + mildly thick liquids. Reports of pt eating about 75% of lunch tray sent. Will send Boost [facility] for additional nutrition until conssitent, adequate oral intake.     Anthropometrics    Height: 5' 7" (170.2 cm),    Last Weight: 66.3 kg (146 lb 2.6 oz) (06/02/24 0407), Weight Method: Bed Scale  BMI (Calculated): 22.9  BMI Classification: normal (BMI 18.5-24.9)        Ideal Body Weight (IBW), Male: 148 lb     % Ideal Body Weight, Male (lb): " 98.76 %                          Usual Weight Provided By: unable to obtain usual weight    Wt Readings from Last 5 Encounters:   06/02/24 66.3 kg (146 lb 2.6 oz)     Weight Change(s) Since Admission:   Wt Readings from Last 1 Encounters:   06/02/24 0407 66.3 kg (146 lb 2.6 oz)   06/01/24 1820 79.4 kg (175 lb)   Admit Weight: 79.4 kg (175 lb) (06/01/24 1820), Weight Method: Bed Scale    Estimated Needs    Weight Used For Calorie Calculations: 66.3 kg (146 lb 2.6 oz)  Energy Calorie Requirements (kcal): 1657-1989kcals/d (25-30kcals/kg)  Energy Need Method: Kcal/kg  Weight Used For Protein Calculations: 66.3 kg (146 lb 2.6 oz)  Protein Requirements: 80g/d (1.2g/kg)  Fluid Requirements (mL): 1657-1989ml fl/d (1ml/kcal)        Enteral Nutrition     Patient not receiving enteral nutrition at this time.    Parenteral Nutrition   Standard Formula: Clinimix E 4.25/5  Custom Formula: not applicable  Additives: none  Rate/Volume: 75mL/hr  Lipids: none  Total Nutrition Provided by Parenteral Nutrition:  Calories Provided  612 kcal/d, 37% needs   Protein Provided  77 g/d, 96% needs   Dextrose Provided  90 g/d, GIR 0.79 mg CHO/kg/min   Fluid Provided  1800 ml/d, 100% needs     Evaluation of Received Nutrient Intake    Calories: not meeting estimated needs  Protein: meeting estimated needs    Patient Education     Not applicable.    Nutrition Diagnosis     PES: Inadequate oral intake related to acute illness as evidenced by NPO since admit. (new)     Nutrition Interventions     Intervention(s): modified composition of meals/snacks, modified composition of parenteral nutrition, modified rate of parenteral nutrition, commercial beverage, and collaboration with other providers    Goal: Meet greater than 80% of nutritional needs by follow-up. (new)  Goal: Consume % of oral supplements by follow-up. (new)    Nutrition Goals & Monitoring     Dietitian will monitor: energy intake, weight change, electrolyte/renal panel, and  glucose/endocrine profile  Discharge planning: too early to determine; pending clinical course  Nutrition Risk/Follow-Up: moderate (follow-up in 3-5 days)   Please consult if re-assessment needed sooner.

## 2024-06-04 LAB
BASOPHILS # BLD AUTO: 0.05 X10(3)/MCL
BASOPHILS NFR BLD AUTO: 0.5 %
EOSINOPHIL # BLD AUTO: 0.19 X10(3)/MCL (ref 0–0.9)
EOSINOPHIL NFR BLD AUTO: 1.9 %
ERYTHROCYTE [DISTWIDTH] IN BLOOD BY AUTOMATED COUNT: 14.4 % (ref 11.5–17)
HCT VFR BLD AUTO: 39.6 % (ref 42–52)
HGB BLD-MCNC: 13 G/DL (ref 14–18)
IMM GRANULOCYTES # BLD AUTO: 0.06 X10(3)/MCL (ref 0–0.04)
IMM GRANULOCYTES NFR BLD AUTO: 0.6 %
LYMPHOCYTES # BLD AUTO: 1.33 X10(3)/MCL (ref 0.6–4.6)
LYMPHOCYTES NFR BLD AUTO: 13.3 %
MCH RBC QN AUTO: 31 PG (ref 27–31)
MCHC RBC AUTO-ENTMCNC: 32.8 G/DL (ref 33–36)
MCV RBC AUTO: 94.5 FL (ref 80–94)
MONOCYTES # BLD AUTO: 1.3 X10(3)/MCL (ref 0.1–1.3)
MONOCYTES NFR BLD AUTO: 13 %
NEUTROPHILS # BLD AUTO: 7.08 X10(3)/MCL (ref 2.1–9.2)
NEUTROPHILS NFR BLD AUTO: 70.7 %
NRBC BLD AUTO-RTO: 0 %
PLATELET # BLD AUTO: 241 X10(3)/MCL (ref 130–400)
PMV BLD AUTO: 10 FL (ref 7.4–10.4)
RBC # BLD AUTO: 4.19 X10(6)/MCL (ref 4.7–6.1)
WBC # SPEC AUTO: 10.01 X10(3)/MCL (ref 4.5–11.5)

## 2024-06-04 PROCEDURE — 63600175 PHARM REV CODE 636 W HCPCS: Performed by: INTERNAL MEDICINE

## 2024-06-04 PROCEDURE — 21400001 HC TELEMETRY ROOM

## 2024-06-04 PROCEDURE — 36415 COLL VENOUS BLD VENIPUNCTURE: CPT | Performed by: INTERNAL MEDICINE

## 2024-06-04 PROCEDURE — 85025 COMPLETE CBC W/AUTO DIFF WBC: CPT | Performed by: INTERNAL MEDICINE

## 2024-06-04 RX ADMIN — LEVETIRACETAM INJECTION 1000 MG: 10 INJECTION INTRAVENOUS at 11:06

## 2024-06-04 RX ADMIN — LEVETIRACETAM INJECTION 1000 MG: 10 INJECTION INTRAVENOUS at 09:06

## 2024-06-04 NOTE — PT/OT/SLP PROGRESS
[facility]  Speech Language Pathology Department  Diet Tolerance Follow-up    Patient Name:  Bandar Medeiros   MRN:  [id number]    Recommendations     General recommendations:  SLP intervention not indicated  Diet texture/consistency recommendations:  Puree solids (IDDSI 4) and mildly thick liquids (IDDSI 2)  Medications: crushed in puree  Swallow strategies/precautions: small bites/sips, slow rate, upright for PO intake, and assist with feeding as needed  Precautions: Standard, fall    Diet Tolerance     Nursing reports no difficulty regarding diet tolerance.      Plan     SLP Follow-Up: No  Plan of Care reviewed with: RN    Time Tracking     SLP Treatment Date: 06/04/2024

## 2024-06-04 NOTE — PROGRESS NOTES
[facility] Hospital Medicine  Progress Note    Patient Name: Bandar Medeiros  MRN: [id number]  Patient Class: IP- Inpatient   Admission Date: 6/1/2024  Length of Stay: 2 days  Attending Physician: Vasquez Eastman MD  Primary Care Provider: Natasha, Primary Doctor        Subjective:     Principal Problem:Seizure    Interval History:  No seizures activities reported by nursing home yesterday.  MRI has not been able to be done due to the lack of 1 page not being signed and I just signed it this morning.    Review of Systems   Unable to perform ROS: Acuity of condition     Objective:     Vital Signs (Most Recent):  Temp: 97.5 °F (36.4 °C) (06/04/24 0319)  Pulse: 76 (06/04/24 0319)  Resp: 18 (06/04/24 0319)  BP: (!) 158/66 (06/04/24 0319)  SpO2: 100 % (06/04/24 0319) Vital Signs (24h Range):  Temp:  [96.7 °F (35.9 °C)-98.8 °F (37.1 °C)] 97.5 °F (36.4 °C)  Pulse:  [] 76  Resp:  [15-19] 18  SpO2:  [81 %-100 %] 100 %  BP: (143-174)/(66-91) 158/66     Weight: 66.3 kg (146 lb 2.6 oz)  Body mass index is 22.89 kg/m².    Intake/Output Summary (Last 24 hours) at 6/4/2024 0652  Last data filed at 6/3/2024 1400  Gross per 24 hour   Intake --   Output 550 ml   Net -550 ml      Physical Exam  HENT:      Head: Normocephalic and atraumatic.      Right Ear: External ear normal.      Left Ear: External ear normal.      Mouth/Throat:      Mouth: Mucous membranes are dry.   Cardiovascular:      Rate and Rhythm: Normal rate and regular rhythm.      Pulses: Normal pulses.      Heart sounds: Normal heart sounds.   Pulmonary:      Effort: Pulmonary effort is normal.      Breath sounds: Normal breath sounds.   Abdominal:      General: Abdomen is flat.      Palpations: Abdomen is soft.   Musculoskeletal:      Cervical back: Neck supple.   Skin:     General: Skin is warm and dry.   Neurological:      Mental Status: He is alert.      Comments: No changes.  He does open his eyes on talking to him.  He will stare at  you.  But will not follow commands.           Overview/Hospital Course:  No big changes.  He does awake he does open the eyes and stare  does not appear to be following any type of command.    Significant Labs: All pertinent labs within the past 24 hours have been reviewed.  BMP:   Recent Labs   Lab 06/02/24  0846      K 4.9   *   CO2 17*   BUN 16.6   CREATININE 0.85   CALCIUM 8.3*     CBC:   Recent Labs   Lab 06/02/24  0845 06/04/24  0023   WBC 11.58* 10.01   HGB 12.4* 13.0*   HCT 38.3* 39.6*    241     CMP:   Recent Labs   Lab 06/02/24  0846      K 4.9   *   CO2 17*   BUN 16.6   CREATININE 0.85   CALCIUM 8.3*       Significant Imaging: I have reviewed all pertinent imaging results/findings within the past 24 hours.    Assessment/Plan:      Active Diagnoses:    Diagnosis Date Noted POA    PRINCIPAL PROBLEM:  Seizure [R56.9] 06/02/2024 Yes      Problems Resolved During this Admission:     VTE Risk Mitigation (From admission, onward)           Ordered     Place CHRISTIAN hose  Until discontinued         06/02/24 0939     IP VTE HIGH RISK PATIENT  Once         06/02/24 0939     Place sequential compression device  Until discontinued         06/01/24 7373                   I signed the document for MRI with contrast.  Continue with PPN and DVT prophylaxis.  Continue with Keppra IV.  No changes in condition.  Family has been unreachable.    Vasquez Eastman MD  Department of Hospital Medicine   [facility]

## 2024-06-05 LAB
CREAT SERPL-MCNC: 0.81 MG/DL (ref 0.73–1.18)
GFR SERPLBLD CREATININE-BSD FMLA CKD-EPI: >60 ML/MIN/1.73/M2

## 2024-06-05 PROCEDURE — 63600175 PHARM REV CODE 636 W HCPCS: Performed by: INTERNAL MEDICINE

## 2024-06-05 PROCEDURE — 21400001 HC TELEMETRY ROOM

## 2024-06-05 PROCEDURE — 82565 ASSAY OF CREATININE: CPT | Performed by: INTERNAL MEDICINE

## 2024-06-05 PROCEDURE — 36415 COLL VENOUS BLD VENIPUNCTURE: CPT | Performed by: INTERNAL MEDICINE

## 2024-06-05 RX ADMIN — LEVETIRACETAM INJECTION 1000 MG: 10 INJECTION INTRAVENOUS at 08:06

## 2024-06-05 RX ADMIN — LEVETIRACETAM INJECTION 1000 MG: 10 INJECTION INTRAVENOUS at 09:06

## 2024-06-05 NOTE — PROGRESS NOTES
[facility] Hospital Medicine  Progress Note    Patient Name: Bandar Medeiros  MRN: [id number]  Patient Class: IP- Inpatient   Admission Date: 6/1/2024  Length of Stay: 3 days  Attending Physician: aVsquez Eastman MD  Primary Care Provider: Natasha, Primary Doctor        Subjective:     Principal Problem:Seizure    Interval History:  No new issues noted.    Review of Systems   Unable to perform ROS: Acuity of condition     Objective:     Vital Signs (Most Recent):  Temp: 97.3 °F (36.3 °C) (06/05/24 0350)  Pulse: 60 (06/04/24 2348)  Resp: 18 (06/05/24 0350)  BP: (!) 160/72 (06/05/24 0350)  SpO2: 98 % (06/04/24 2348) Vital Signs (24h Range):  Temp:  [97.3 °F (36.3 °C)-98 °F (36.7 °C)] 97.3 °F (36.3 °C)  Pulse:  [37-65] 60  Resp:  [18-22] 18  SpO2:  [98 %-100 %] 98 %  BP: (142-167)/(67-80) 160/72     Weight: 66.3 kg (146 lb 2.6 oz)  Body mass index is 22.89 kg/m².  No intake or output data in the 24 hours ending 06/05/24 0628   Physical Exam  HENT:      Head: Normocephalic and atraumatic.      Right Ear: External ear normal.      Left Ear: External ear normal.   Cardiovascular:      Rate and Rhythm: Normal rate and regular rhythm.      Pulses: Normal pulses.      Heart sounds: Normal heart sounds.   Pulmonary:      Breath sounds: Normal breath sounds.   Abdominal:      General: Bowel sounds are normal.   Musculoskeletal:      Cervical back: Neck supple.   Skin:     General: Skin is warm and dry.   Neurological:      Mental Status: Mental status is at baseline.           Overview/Hospital Course:  I did all the paperwork for the MRI but I do not see it here.  It appears to be done but I do not think he has been over-read by the radiologist.  Ultrasound was ordered also of the left upper extremity due to swelling but I do not see it reported here either.  He has been cleared for pureed diet we will see how he does with that today.    Significant Labs: All pertinent labs within the past 24 hours  "have been reviewed.  CBC:   Recent Labs   Lab 06/04/24  0023   WBC 10.01   HGB 13.0*   HCT 39.6*        CMP: No results for input(s): "NA", "K", "CL", "CO2", "GLU", "BUN", "CREATININE", "CALCIUM", "PROT", "ALBUMIN", "BILITOT", "ALKPHOS", "AST", "ALT", "ANIONGAP", "EGFRNONAA" in the last 48 hours.    Invalid input(s): "ESTGFA[place]"    Significant Imaging: I have reviewed all pertinent imaging results/findings within the past 24 hours.    Assessment/Plan:      Active Diagnoses:    Diagnosis Date Noted POA    PRINCIPAL PROBLEM:  Seizure [R56.9] 06/02/2024 Yes      Problems Resolved During this Admission:     VTE Risk Mitigation (From admission, onward)           Ordered     Place CHRISTIAN hose  Until discontinued         06/02/24 0939     IP VTE HIGH RISK PATIENT  Once         06/02/24 0939     Place sequential compression device  Until discontinued         06/01/24 7548                       Vasquez Eastman MD  Department of Hospital Medicine   [facility]    "

## 2024-06-05 NOTE — PLAN OF CARE
Problem: Adult Inpatient Plan of Care  Goal: Optimal Comfort and Wellbeing  Outcome: Progressing     Problem: Skin Injury Risk Increased  Goal: Skin Health and Integrity  Outcome: Progressing     Problem: Stroke, Ischemic (Includes Transient Ischemic Attack)  Goal: Optimal Coping  Outcome: Progressing     Problem: Stroke, Ischemic (Includes Transient Ischemic Attack)  Goal: Improved Communication Skills  Outcome: Progressing     Problem: Stroke, Ischemic (Includes Transient Ischemic Attack)  Goal: Optimal Functional Ability  Outcome: Progressing     Problem: Stroke, Ischemic (Includes Transient Ischemic Attack)  Goal: Safe and Effective Swallow  Outcome: Progressing     Problem: Stroke, Ischemic (Includes Transient Ischemic Attack)  Goal: Effective Urinary Elimination  Outcome: Progressing

## 2024-06-06 PROCEDURE — 25500020 PHARM REV CODE 255: Performed by: INTERNAL MEDICINE

## 2024-06-06 PROCEDURE — 63600175 PHARM REV CODE 636 W HCPCS: Performed by: INTERNAL MEDICINE

## 2024-06-06 PROCEDURE — A9577 INJ MULTIHANCE: HCPCS | Performed by: INTERNAL MEDICINE

## 2024-06-06 PROCEDURE — 21400001 HC TELEMETRY ROOM

## 2024-06-06 RX ADMIN — LEVETIRACETAM INJECTION 1000 MG: 10 INJECTION INTRAVENOUS at 09:06

## 2024-06-06 RX ADMIN — GADOBENATE DIMEGLUMINE 13 ML: 529 INJECTION, SOLUTION INTRAVENOUS at 10:06

## 2024-06-06 NOTE — PLAN OF CARE
Problem: Adult Inpatient Plan of Care  Goal: Plan of Care Review  6/6/2024 0748 by Ana Carlson LPN  Outcome: Progressing  6/6/2024 0746 by Ana Carlson LPN  Outcome: Progressing  Goal: Patient-Specific Goal (Individualized)  6/6/2024 0748 by Ana Carlson LPN  Outcome: Progressing  6/6/2024 0746 by Ana Carlson LPN  Outcome: Progressing  Goal: Absence of Hospital-Acquired Illness or Injury  6/6/2024 0748 by Ana Carlson LPN  Outcome: Progressing  6/6/2024 0746 by Ana Carlson LPN  Outcome: Progressing  Goal: Optimal Comfort and Wellbeing  6/6/2024 0748 by Ana Carlson LPN  Outcome: Progressing  6/6/2024 0746 by Ana Carlson LPN  Outcome: Progressing  Goal: Readiness for Transition of Care  6/6/2024 0748 by Ana Carlson LPN  Outcome: Progressing  6/6/2024 0746 by Ana Carlson LPN  Outcome: Progressing     Problem: Skin Injury Risk Increased  Goal: Skin Health and Integrity  6/6/2024 0748 by Ana Carlson LPN  Outcome: Progressing  6/6/2024 0746 by Ana Carlson LPN  Outcome: Progressing     Problem: Infection  Goal: Absence of Infection Signs and Symptoms  6/6/2024 0748 by Ana Carlson LPN  Outcome: Progressing  6/6/2024 0746 by Ana Carlson LPN  Outcome: Progressing     Problem: Stroke, Ischemic (Includes Transient Ischemic Attack)  Goal: Optimal Coping  6/6/2024 0748 by Ana Carlson LPN  Outcome: Progressing  6/6/2024 0746 by Ana Carlson LPN  Outcome: Progressing  Goal: Effective Bowel Elimination  6/6/2024 0748 by Ana Carlson LPN  Outcome: Progressing  6/6/2024 0746 by Ana Carlson LPN  Outcome: Progressing  Goal: Optimal Cerebral Tissue Perfusion  6/6/2024 0748 by Ana Carlson LPN  Outcome: Progressing  6/6/2024 0746 by Ana Carlson LPN  Outcome: Progressing  Goal: Optimal Cognitive Function  6/6/2024 0748 by Ana Carlson LPN  Outcome: Progressing  6/6/2024 0746 by  Ana Carlson LPN  Outcome: Progressing  Goal: Improved Communication Skills  6/6/2024 0748 by Ana Carlson LPN  Outcome: Progressing  6/6/2024 0746 by Ana Carlson LPN  Outcome: Progressing  Goal: Optimal Functional Ability  6/6/2024 0748 by Ana Carlson LPN  Outcome: Progressing  6/6/2024 0746 by Ana Carlson LPN  Outcome: Progressing  Goal: Optimal Nutrition Intake  6/6/2024 0748 by Ana Carlson LPN  Outcome: Progressing  6/6/2024 0746 by Ana Carlson LPN  Outcome: Progressing  Goal: Effective Oxygenation and Ventilation  6/6/2024 0748 by Ana Carlson LPN  Outcome: Progressing  6/6/2024 0746 by Ana Carlson LPN  Outcome: Progressing  Goal: Improved Sensorimotor Function  6/6/2024 0748 by Ana Carlson LPN  Outcome: Progressing  6/6/2024 0746 by Ana Carlson LPN  Outcome: Progressing  Goal: Safe and Effective Swallow  6/6/2024 0748 by Ana Carlson LPN  Outcome: Progressing  6/6/2024 0746 by Ana Carlson LPN  Outcome: Progressing  Goal: Effective Urinary Elimination  6/6/2024 0748 by Ana Carlson LPN  Outcome: Progressing  6/6/2024 0746 by Ana Carlson LPN  Outcome: Progressing

## 2024-06-06 NOTE — PROGRESS NOTES
[facility] Hospital Medicine  Progress Note    Patient Name: Bandar Medeiros  MRN: [id number]  Patient Class: IP- Inpatient   Admission Date: 6/1/2024  Length of Stay: 4 days  Attending Physician: Vasquez Eastman MD  Primary Care Provider: Natasha, Primary Doctor        Subjective:     Principal Problem:Seizure    Interval History:  No issues noted over the last night.  MRI performed but I can not find the results.    Review of Systems   Unable to perform ROS: Acuity of condition     Objective:     Vital Signs (Most Recent):  Temp: 97.9 °F (36.6 °C) (06/06/24 0806)  Pulse: 80 (06/06/24 0806)  Resp: 20 (06/06/24 0400)  BP: (!) 170/82 (06/06/24 0806)  SpO2: 97 % (06/06/24 0806) Vital Signs (24h Range):  Temp:  [97.7 °F (36.5 °C)-98.4 °F (36.9 °C)] 97.9 °F (36.6 °C)  Pulse:  [62-86] 80  Resp:  [18-20] 20  SpO2:  [97 %-100 %] 97 %  BP: (138-170)/(66-97) 170/82     Weight: 66.3 kg (146 lb 2.6 oz)  Body mass index is 22.89 kg/m².    Intake/Output Summary (Last 24 hours) at 6/6/2024 0918  Last data filed at 6/6/2024 0646  Gross per 24 hour   Intake --   Output 200 ml   Net -200 ml      Physical Exam  HENT:      Head: Normocephalic and atraumatic.      Right Ear: External ear normal.      Left Ear: External ear normal.      Mouth/Throat:      Mouth: Mucous membranes are dry.   Cardiovascular:      Rate and Rhythm: Normal rate and regular rhythm.      Pulses: Normal pulses.      Heart sounds: Normal heart sounds.   Pulmonary:      Effort: Pulmonary effort is normal.      Breath sounds: Normal breath sounds.   Abdominal:      General: Bowel sounds are normal.      Palpations: Abdomen is soft.   Musculoskeletal:      Cervical back: Neck supple.   Skin:     General: Skin is warm and dry.   Neurological:      Mental Status: He is alert.           Overview/Hospital Course:  Patient more alert today eyes open faster than yesterday.  Responds to verbal commands however he still can not voice and talk.   "But he is behaving like he is coming out of a stroke.  MRI performed but I do not have the results yet.  I can not find it either.  Still listed at not released.  I do plan to probably discharge him tomorrow.  Awaiting neurological recommendations and I think neurology's waiting for the MRI results also.  Continue present treatment.    Significant Labs: All pertinent labs within the past 24 hours have been reviewed.  BMP:   Recent Labs   Lab 06/05/24  0919   CREATININE 0.81     CBC: No results for input(s): "WBC", "HGB", "HCT", "PLT" in the last 48 hours.  CMP:   Recent Labs   Lab 06/05/24  0919   CREATININE 0.81       Significant Imaging: I have reviewed all pertinent imaging results/findings within the past 24 hours.    Assessment/Plan:      Active Diagnoses:    Diagnosis Date Noted POA    PRINCIPAL PROBLEM:  Seizure [R56.9] 06/02/2024 Yes      Problems Resolved During this Admission:     VTE Risk Mitigation (From admission, onward)           Ordered     Place CHRISTIAN hose  Until discontinued         06/02/24 0939     IP VTE HIGH RISK PATIENT  Once         06/02/24 0939     Place sequential compression device  Until discontinued         06/01/24 0751                       Vasquez Eastman MD  Department of Hospital Medicine   [facility]    "

## 2024-06-07 PROCEDURE — 21400001 HC TELEMETRY ROOM

## 2024-06-07 PROCEDURE — 25000003 PHARM REV CODE 250: Performed by: INTERNAL MEDICINE

## 2024-06-07 PROCEDURE — 63600175 PHARM REV CODE 636 W HCPCS: Performed by: INTERNAL MEDICINE

## 2024-06-07 RX ORDER — PRAVASTATIN SODIUM 40 MG/1
40 TABLET ORAL NIGHTLY
Status: DISCONTINUED | OUTPATIENT
Start: 2024-06-07 | End: 2024-06-10 | Stop reason: HOSPADM

## 2024-06-07 RX ORDER — NAPROXEN SODIUM 220 MG/1
81 TABLET, FILM COATED ORAL DAILY
Status: DISCONTINUED | OUTPATIENT
Start: 2024-06-07 | End: 2024-06-10 | Stop reason: HOSPADM

## 2024-06-07 RX ORDER — DILTIAZEM HYDROCHLORIDE 30 MG/1
30 TABLET, FILM COATED ORAL 2 TIMES DAILY
Status: DISCONTINUED | OUTPATIENT
Start: 2024-06-07 | End: 2024-06-10 | Stop reason: HOSPADM

## 2024-06-07 RX ADMIN — LEVETIRACETAM INJECTION 1000 MG: 10 INJECTION INTRAVENOUS at 08:06

## 2024-06-07 RX ADMIN — ASPIRIN 81 MG CHEWABLE TABLET 81 MG: 81 TABLET CHEWABLE at 09:06

## 2024-06-07 RX ADMIN — PRAVASTATIN SODIUM 40 MG: 40 TABLET ORAL at 08:06

## 2024-06-07 RX ADMIN — LEVETIRACETAM INJECTION 1000 MG: 10 INJECTION INTRAVENOUS at 09:06

## 2024-06-07 RX ADMIN — DILTIAZEM HYDROCHLORIDE 30 MG: 30 TABLET, FILM COATED ORAL at 08:06

## 2024-06-07 NOTE — PROGRESS NOTES
[facility] Hospital Medicine  Progress Note    Patient Name: Bandar Medeiros  MRN: [id number]  Patient Class: IP- Inpatient   Admission Date: 6/1/2024  Length of Stay: 5 days  Attending Physician: Vasquez Eastman MD  Primary Care Provider: Natasha, Primary Doctor        Subjective:     Principal Problem:Seizure    Interval History:  Uneventful night.  Patient actually starting to respond better.  MRI discharge a very large ischemic infarct.  On the left and Ca.  Consistent with the findings in physical exam.    Review of Systems   Unable to perform ROS: Acuity of condition     Objective:     Vital Signs (Most Recent):  Temp: 98 °F (36.7 °C) (06/07/24 0731)  Pulse: 65 (06/07/24 0731)  Resp: 18 (06/07/24 0336)  BP: (!) 167/84 (06/07/24 0731)  SpO2: 100 % (06/07/24 0731) Vital Signs (24h Range):  Temp:  [97.5 °F (36.4 °C)-98 °F (36.7 °C)] 98 °F (36.7 °C)  Pulse:  [65-79] 65  Resp:  [16-22] 18  SpO2:  [99 %-100 %] 100 %  BP: (140-167)/(71-84) 167/84     Weight: 66.3 kg (146 lb 2.6 oz)  Body mass index is 22.89 kg/m².    Intake/Output Summary (Last 24 hours) at 6/7/2024 0825  Last data filed at 6/7/2024 0534  Gross per 24 hour   Intake 720 ml   Output 600 ml   Net 120 ml      Physical Exam  HENT:      Head: Normocephalic and atraumatic.      Right Ear: External ear normal.      Left Ear: External ear normal.      Mouth/Throat:      Mouth: Mucous membranes are dry.   Cardiovascular:      Rate and Rhythm: Normal rate and regular rhythm.      Pulses: Normal pulses.      Heart sounds: Normal heart sounds.   Pulmonary:      Breath sounds: Normal breath sounds.   Abdominal:      General: Bowel sounds are normal.      Palpations: Abdomen is soft.   Musculoskeletal:      Cervical back: Neck supple.   Skin:     General: Skin is warm and dry.   Neurological:      Mental Status: He is alert. Mental status is at baseline.           Overview/Hospital Course:  Patient will be on an aspirin and also on  "Plavix.  I will do ultrasound carotids.  Await further input from Neurology.    Significant Labs: All pertinent labs within the past 24 hours have been reviewed.  CBC: No results for input(s): "WBC", "HGB", "HCT", "PLT" in the last 48 hours.  CMP:   Recent Labs   Lab 06/05/24  0919   CREATININE 0.81       Significant Imaging: I have reviewed all pertinent imaging results/findings within the past 24 hours.    Assessment/Plan:      Active Diagnoses:    Diagnosis Date Noted POA    PRINCIPAL PROBLEM:  Seizure [R56.9] 06/02/2024 Yes      Problems Resolved During this Admission:     VTE Risk Mitigation (From admission, onward)           Ordered     Place CHRISTIAN hose  Until discontinued         06/02/24 0939     IP VTE HIGH RISK PATIENT  Once         06/02/24 0939     Place sequential compression device  Until discontinued         06/01/24 2334                       Vasquez Eastman MD  Department of Hospital Medicine   [facility]    "

## 2024-06-07 NOTE — PROGRESS NOTES
Inpatient Nutrition Assessment    Admit Date: 6/1/2024   Total duration of encounter: 6 days   Patient Age: 83 y.o.    Nutrition Recommendation/Prescription     Continue oral diet texture modifications as recommended by SLP, currently: puree solids + mildly thick liquids.   Assistance and encouragement of meals.   Boost Very High Calorie (provides 530 kcal, 22 g protein per serving) BID for additional nutrition. Mildly thick appropriate.     Communication of Recommendations: reviewed with nurse and reviewed with patient    Nutrition Assessment     Malnutrition Assessment/Nutrition-Focused Physical Exam    Malnutrition Context: chronic illness (06/07/24 0942)  Malnutrition Level:  (unable to determine, pt unable to provide hx) (06/07/24 0942)  Energy Intake (Malnutrition):  (unable to determine) (06/07/24 0942)  Weight Loss (Malnutrition):  (unable to determine) (06/07/24 0942)  Subcutaneous Fat (Malnutrition):  (does not meet criteria) (06/07/24 0942)           Muscle Mass (Malnutrition): mild depletion (06/07/24 0942)  Grawn Region (Muscle Loss): mild depletion  Clavicle Bone Region (Muscle Loss): mild depletion  Clavicle and Acromion Bone Region (Muscle Loss): mild depletion                 Fluid Accumulation (Malnutrition):  (does not meet criteria) (06/07/24 0942)        A minimum of two characteristics is recommended for diagnosis of either severe or non-severe malnutrition.    Chart Review    Reason Seen: continuous nutrition monitoring, malnutrition screening tool (MST), and follow-up    Malnutrition Screening Tool Results   Have you recently lost weight without trying?: Unsure  Have you been eating poorly because of a decreased appetite?: No   MST Score: 2   Diagnosis:  NSTEMI- Suspect Type II in the Setting of Seizure Like Activity  Persistent AF- Now AF CVR    - On Eliquis  Hypertension  Dyslipidemia  Possible Seizure    - CT Head Negative for Acute Intracranial Abnormality  LVSD, improved    - EF  35%->50-55% 6/2024  Moderate aortic reguritation  Possible Prostate Cancer (Per Family Report)  Leukocytosis  Anemia  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   Dementia  Peripheral Neuropathy    - On Neurontin   DNR Status     Relevant Medical History: OA, Hypertension, Dyslipidemia, SVT, PAF (Eliquis), LVSD     Scheduled Medications:  aspirin, 81 mg, Daily  levETIRAcetam (Keppra) IV (PEDS and ADULTS), 1,000 mg, Q12H  pravastatin, 40 mg, QHS    Continuous Infusions:     PRN Medications:reviewed  Calorie Containing IV Medications: no significant kcals from medications at this time    Recent Labs   Lab 06/01/24  1847 06/02/24  0845 06/02/24  0846 06/04/24  0023 06/05/24  0919     --  142  --   --    K 4.7  --  4.9  --   --    CALCIUM 8.7*  --  8.3*  --   --    CO2 20*  --  17*  --   --    BUN 19.6  --  16.6  --   --    CREATININE 0.91  --  0.85  --  0.81   EGFRNORACEVR >60  --  >60  --  >60   GLUCOSE 103  --  90  --   --    BILITOT 0.6  --   --   --   --    ALKPHOS 62  --   --   --   --    ALT 11  --   --   --   --    AST 18  --   --   --   --    ALBUMIN 3.2*  --   --   --   --    WBC 15.29* 11.58*  --  10.01  --    HGB 12.9* 12.4*  --  13.0*  --    HCT 39.7* 38.3*  --  39.6*  --      Nutrition Orders:  Diet Pureed (IDDSI Level 4) Mildly Thick Liquids (IDDSI Level 2)  Dietary nutrition supplements Boost Very High Calorie Nutritional Drink - Any flavor; BID    Appetite/Oral Intake: fair/50-75% of meals  Factors Affecting Nutritional Intake: impaired cognitive status/motor control and NPO  Social Needs Impacting Access to Food: unable to assess at this time; will attempt on follow-up  Food/Rastafarian/Cultural Preferences: none reported  Food Allergies: none reported  Last Bowel Movement: 06/05/24  Wound(s):      Comments    6/3/24 Pt getting bathed during rounds, spoke to RN. Pt NPO since admit (x2 days) started on PPN with electrolytes by MD. SLP evaluated this morning and cleared for  "puree solids + mildly thick liquids. Reports of pt eating about 75% of lunch tray sent. Will send Boost [facility] for additional nutrition until conssitent, adequate oral intake.   6/7/24 Breakfast not eaten yet. No documentation of % po intake last few days, RN unsure, first day with pt. Pt does shake his head "yes" to liking Boost supplement. Encouraged him to try and drink BID. Shakes head no to any abdominal pain. Updated malnutrition assessment: mild muscle loss noted but unable to dx malnutrition s/t no hx on wt or po intake hx.     Anthropometrics    Height: 5' 7" (170.2 cm),    Last Weight: 66.3 kg (146 lb 2.6 oz) (06/02/24 0407), Weight Method: Bed Scale  BMI (Calculated): 22.9  BMI Classification: normal (BMI 18.5-24.9)        Ideal Body Weight (IBW), Male: 148 lb     % Ideal Body Weight, Male (lb): 98.76 %                          Usual Weight Provided By: unable to obtain usual weight    Wt Readings from Last 5 Encounters:   06/02/24 66.3 kg (146 lb 2.6 oz)     Weight Change(s) Since Admission:   Wt Readings from Last 1 Encounters:   06/02/24 0407 66.3 kg (146 lb 2.6 oz)   06/01/24 1820 79.4 kg (175 lb)   Admit Weight: 79.4 kg (175 lb) (06/01/24 1820), Weight Method: Bed Scale    Estimated Needs    Weight Used For Calorie Calculations: 66.3 kg (146 lb 2.6 oz)  Energy Calorie Requirements (kcal): 1657-1989kcals/d (25-30kcals/kg)  Energy Need Method: Kcal/kg  Weight Used For Protein Calculations: 66.3 kg (146 lb 2.6 oz)  Protein Requirements: 80g/d (1.2g/kg)  Fluid Requirements (mL): 1657-1989ml fl/d (1ml/kcal)        Enteral Nutrition     Patient not receiving enteral nutrition at this time.    Parenteral Nutrition     Patient not receiving parenteral nutrition at this time.     Evaluation of Received Nutrient Intake    Calories: not meeting estimated needs  Protein: not meeting estimated needs    Patient Education     Not applicable.    Nutrition Diagnosis     PES: Inadequate oral intake related to acute " illness as evidenced by NPO since admit. (active)     Nutrition Interventions     Intervention(s): modified composition of meals/snacks, commercial beverage, and collaboration with other providers    Goal: Meet greater than 80% of nutritional needs by follow-up. (goal progressing)  Goal: Consume % of oral supplements by follow-up. (goal progressing)    Nutrition Goals & Monitoring     Dietitian will monitor: energy intake, weight change, electrolyte/renal panel, and glucose/endocrine profile  Discharge planning: continue texture modifications per SLP recommendations no dietary restrictions on diet with Boost VHC oral supplements (mildly thick appropriate)  Nutrition Risk/Follow-Up: moderate (follow-up in 3-5 days)   Please consult if re-assessment needed sooner.

## 2024-06-08 PROCEDURE — 25000003 PHARM REV CODE 250: Performed by: INTERNAL MEDICINE

## 2024-06-08 PROCEDURE — 21400001 HC TELEMETRY ROOM

## 2024-06-08 PROCEDURE — 63600175 PHARM REV CODE 636 W HCPCS: Performed by: INTERNAL MEDICINE

## 2024-06-08 RX ORDER — LABETALOL HYDROCHLORIDE 5 MG/ML
10 INJECTION, SOLUTION INTRAVENOUS EVERY 4 HOURS PRN
Status: DISCONTINUED | OUTPATIENT
Start: 2024-06-08 | End: 2024-06-10 | Stop reason: HOSPADM

## 2024-06-08 RX ADMIN — DILTIAZEM HYDROCHLORIDE 30 MG: 30 TABLET, FILM COATED ORAL at 08:06

## 2024-06-08 RX ADMIN — PRAVASTATIN SODIUM 40 MG: 40 TABLET ORAL at 08:06

## 2024-06-08 RX ADMIN — DILTIAZEM HYDROCHLORIDE 30 MG: 30 TABLET, FILM COATED ORAL at 09:06

## 2024-06-08 RX ADMIN — LEVETIRACETAM INJECTION 1000 MG: 10 INJECTION INTRAVENOUS at 08:06

## 2024-06-08 RX ADMIN — LEVETIRACETAM INJECTION 1000 MG: 10 INJECTION INTRAVENOUS at 09:06

## 2024-06-08 RX ADMIN — ASPIRIN 81 MG CHEWABLE TABLET 81 MG: 81 TABLET CHEWABLE at 09:06

## 2024-06-08 NOTE — PROGRESS NOTES
[facility] Hospital Medicine  Progress Note    Patient Name: Bandar Medeiros  MRN: [id number]  Patient Class: IP- Inpatient   Admission Date: 6/1/2024  Length of Stay: 6 days  Attending Physician: Vasquez Eastman MD  Primary Care Provider: Natasha, Primary Doctor        Subjective:     Principal Problem:Seizure    Interval History: Patient continues to seem more alert but still nonverbal.    Review of Systems   Unable to perform ROS: Acuity of condition     Objective:     Vital Signs (Most Recent):  Temp: 97.5 °F (36.4 °C) (06/08/24 0728)  Pulse: 87 (06/08/24 0728)  Resp: 18 (06/08/24 0728)  BP: (!) 164/78 (06/08/24 0728)  SpO2: 100 % (06/08/24 0728) Vital Signs (24h Range):  Temp:  [97.5 °F (36.4 °C)-98.3 °F (36.8 °C)] 97.5 °F (36.4 °C)  Pulse:  [60-87] 87  Resp:  [18-19] 18  SpO2:  [96 %-100 %] 100 %  BP: (144-169)/(62-94) 164/78     Weight: 66.3 kg (146 lb 2.6 oz)  Body mass index is 22.89 kg/m².    Intake/Output Summary (Last 24 hours) at 6/8/2024 1128  Last data filed at 6/7/2024 1800  Gross per 24 hour   Intake 240 ml   Output --   Net 240 ml      Physical Exam  HENT:      Head: Normocephalic and atraumatic.      Right Ear: External ear normal.      Left Ear: External ear normal.      Nose: Nose normal.      Mouth/Throat:      Mouth: Mucous membranes are dry.   Cardiovascular:      Pulses: Normal pulses.      Heart sounds: Normal heart sounds.   Pulmonary:      Effort: Pulmonary effort is normal.      Breath sounds: Normal breath sounds.   Abdominal:      General: Bowel sounds are normal.      Palpations: Abdomen is soft.   Musculoskeletal:         General: Swelling: I did start the patient on a statin and aspirin..   Skin:     General: Skin is warm and dry.   Neurological:      Mental Status: He is alert.           Overview/Hospital Course: Patient is improving.  But still nonverbal.  I repeat blood work for tomorrow.  Continue with mechanical diet.  Awaiting further recommendations  "from neurology.I did start patient on a statin and aspirin    Significant Labs: All pertinent labs within the past 24 hours have been reviewed.  BMP: No results for input(s): "GLU", "NA", "K", "CL", "CO2", "BUN", "CREATININE", "CALCIUM", "MG" in the last 48 hours.  CBC: No results for input(s): "WBC", "HGB", "HCT", "PLT" in the last 48 hours.  CMP: No results for input(s): "NA", "K", "CL", "CO2", "GLU", "BUN", "CREATININE", "CALCIUM", "PROT", "ALBUMIN", "BILITOT", "ALKPHOS", "AST", "ALT", "ANIONGAP", "EGFRNONAA" in the last 48 hours.    Invalid input(s): "ESTGFAFRICA"    Significant Imaging: I have reviewed all pertinent imaging results/findings within the past 24 hours.    Assessment/Plan:      Active Diagnoses:    Diagnosis Date Noted POA    PRINCIPAL PROBLEM:  Seizure [R56.9] 06/02/2024 Yes      Problems Resolved During this Admission:     VTE Risk Mitigation (From admission, onward)           Ordered     Place CHRISTIAN hose  Until discontinued         06/02/24 0939     IP VTE HIGH RISK PATIENT  Once         06/02/24 0939     Place sequential compression device  Until discontinued         06/01/24 6950                       Vasquez Eastman MD  Department of Hospital Medicine   [facility]    "

## 2024-06-09 LAB
ANION GAP SERPL CALC-SCNC: 7 MEQ/L
BASOPHILS # BLD AUTO: 0.03 X10(3)/MCL
BASOPHILS NFR BLD AUTO: 0.3 %
BUN SERPL-MCNC: 17.7 MG/DL (ref 8.4–25.7)
CALCIUM SERPL-MCNC: 8.6 MG/DL (ref 8.8–10)
CHLORIDE SERPL-SCNC: 106 MMOL/L (ref 98–107)
CO2 SERPL-SCNC: 31 MMOL/L (ref 23–31)
CREAT SERPL-MCNC: 0.81 MG/DL (ref 0.73–1.18)
CREAT/UREA NIT SERPL: 22
EOSINOPHIL # BLD AUTO: 0.22 X10(3)/MCL (ref 0–0.9)
EOSINOPHIL NFR BLD AUTO: 2.5 %
ERYTHROCYTE [DISTWIDTH] IN BLOOD BY AUTOMATED COUNT: 14.1 % (ref 11.5–17)
GFR SERPLBLD CREATININE-BSD FMLA CKD-EPI: >60 ML/MIN/1.73/M2
GLUCOSE SERPL-MCNC: 97 MG/DL (ref 82–115)
HCT VFR BLD AUTO: 36.9 % (ref 42–52)
HGB BLD-MCNC: 11.8 G/DL (ref 14–18)
IMM GRANULOCYTES # BLD AUTO: 0.05 X10(3)/MCL (ref 0–0.04)
IMM GRANULOCYTES NFR BLD AUTO: 0.6 %
LYMPHOCYTES # BLD AUTO: 1.21 X10(3)/MCL (ref 0.6–4.6)
LYMPHOCYTES NFR BLD AUTO: 14 %
MCH RBC QN AUTO: 30.9 PG (ref 27–31)
MCHC RBC AUTO-ENTMCNC: 32 G/DL (ref 33–36)
MCV RBC AUTO: 96.6 FL (ref 80–94)
MONOCYTES # BLD AUTO: 0.95 X10(3)/MCL (ref 0.1–1.3)
MONOCYTES NFR BLD AUTO: 11 %
NEUTROPHILS # BLD AUTO: 6.2 X10(3)/MCL (ref 2.1–9.2)
NEUTROPHILS NFR BLD AUTO: 71.6 %
NRBC BLD AUTO-RTO: 0 %
PLATELET # BLD AUTO: 236 X10(3)/MCL (ref 130–400)
PMV BLD AUTO: 10.5 FL (ref 7.4–10.4)
POTASSIUM SERPL-SCNC: 4.4 MMOL/L (ref 3.5–5.1)
RBC # BLD AUTO: 3.82 X10(6)/MCL (ref 4.7–6.1)
SODIUM SERPL-SCNC: 144 MMOL/L (ref 136–145)
WBC # SPEC AUTO: 8.66 X10(3)/MCL (ref 4.5–11.5)

## 2024-06-09 PROCEDURE — 36415 COLL VENOUS BLD VENIPUNCTURE: CPT | Performed by: INTERNAL MEDICINE

## 2024-06-09 PROCEDURE — 21400001 HC TELEMETRY ROOM

## 2024-06-09 PROCEDURE — 85025 COMPLETE CBC W/AUTO DIFF WBC: CPT | Performed by: INTERNAL MEDICINE

## 2024-06-09 PROCEDURE — 25000003 PHARM REV CODE 250: Performed by: INTERNAL MEDICINE

## 2024-06-09 PROCEDURE — 80048 BASIC METABOLIC PNL TOTAL CA: CPT | Performed by: INTERNAL MEDICINE

## 2024-06-09 PROCEDURE — 63600175 PHARM REV CODE 636 W HCPCS: Performed by: INTERNAL MEDICINE

## 2024-06-09 RX ADMIN — DILTIAZEM HYDROCHLORIDE 30 MG: 30 TABLET, FILM COATED ORAL at 08:06

## 2024-06-09 RX ADMIN — LEVETIRACETAM INJECTION 1000 MG: 10 INJECTION INTRAVENOUS at 08:06

## 2024-06-09 RX ADMIN — ASPIRIN 81 MG CHEWABLE TABLET 81 MG: 81 TABLET CHEWABLE at 08:06

## 2024-06-09 RX ADMIN — PRAVASTATIN SODIUM 40 MG: 40 TABLET ORAL at 08:06

## 2024-06-09 NOTE — PROGRESS NOTES
[facility] Hospital Medicine  Progress Note    Patient Name: Bandar Medeiros  MRN: [id number]  Patient Class: IP- Inpatient   Admission Date: 6/1/2024  Length of Stay: 7 days  Attending Physician: Vasquez Eastman MD  Primary Care Provider: Natasha, Primary Doctor        Subjective:     Principal Problem:Seizure    Interval History:  No new events overnight    Review of Systems   Unable to perform ROS: Patient nonverbal     Objective:     Vital Signs (Most Recent):  Temp: 97.5 °F (36.4 °C) (06/09/24 1114)  Pulse: 80 (06/09/24 1114)  Resp: 18 (06/09/24 0701)  BP: (!) 145/68 (06/09/24 1114)  SpO2: 100 % (06/09/24 1114) Vital Signs (24h Range):  Temp:  [97.3 °F (36.3 °C)-98.5 °F (36.9 °C)] 97.5 °F (36.4 °C)  Pulse:  [62-87] 80  Resp:  [18] 18  SpO2:  [98 %-100 %] 100 %  BP: (130-163)/(66-79) 145/68     Weight: 66.3 kg (146 lb 2.6 oz)  Body mass index is 22.89 kg/m².    Intake/Output Summary (Last 24 hours) at 6/9/2024 1206  Last data filed at 6/9/2024 0800  Gross per 24 hour   Intake 480 ml   Output 950 ml   Net -470 ml      Physical Exam  HENT:      Head: Normocephalic and atraumatic.      Right Ear: External ear normal.      Left Ear: External ear normal.      Mouth/Throat:      Mouth: Mucous membranes are dry.   Cardiovascular:      Rate and Rhythm: Normal rate and regular rhythm.      Heart sounds: Normal heart sounds.   Pulmonary:      Breath sounds: Normal breath sounds.   Abdominal:      General: Bowel sounds are normal.      Palpations: Abdomen is soft.   Musculoskeletal:      Cervical back: Neck supple.   Skin:     General: Skin is warm and dry.   Neurological:      Mental Status: Mental status is at baseline.           Overview/Hospital Course:  Patient seems to be more and more alert.  He actually responded to my verbal commands but can not speak.  Otherwise he is improving.  Plan is to discharge him back to nursing tomorrow    Significant Labs: All pertinent labs within the past 24  hours have been reviewed.  BMP:   Recent Labs   Lab 06/09/24  0017      K 4.4      CO2 31   BUN 17.7   CREATININE 0.81   CALCIUM 8.6*     CBC:   Recent Labs   Lab 06/09/24  0017   WBC 8.66   HGB 11.8*   HCT 36.9*        CMP:   Recent Labs   Lab 06/09/24  0017      K 4.4      CO2 31   BUN 17.7   CREATININE 0.81   CALCIUM 8.6*       Significant Imaging: I have reviewed all pertinent imaging results/findings within the past 24 hours.    Assessment/Plan:      Active Diagnoses:    Diagnosis Date Noted POA    PRINCIPAL PROBLEM:  Seizure [R56.9] 06/02/2024 Yes      Problems Resolved During this Admission:     VTE Risk Mitigation (From admission, onward)           Ordered     Place CHRISTIAN hose  Until discontinued         06/02/24 0939     IP VTE HIGH RISK PATIENT  Once         06/02/24 0939     Place sequential compression device  Until discontinued         06/01/24 9170                       Vasquez Eastman MD  Department of Hospital Medicine   [facility]

## 2024-06-10 VITALS
HEIGHT: 67 IN | WEIGHT: 146.19 LBS | SYSTOLIC BLOOD PRESSURE: 118 MMHG | OXYGEN SATURATION: 97 % | TEMPERATURE: 99 F | DIASTOLIC BLOOD PRESSURE: 67 MMHG | RESPIRATION RATE: 18 BRPM | BODY MASS INDEX: 22.94 KG/M2 | HEART RATE: 73 BPM

## 2024-06-10 PROCEDURE — 27000221 HC OXYGEN, UP TO 24 HOURS

## 2024-06-10 PROCEDURE — 25000003 PHARM REV CODE 250: Performed by: INTERNAL MEDICINE

## 2024-06-10 PROCEDURE — 99900035 HC TECH TIME PER 15 MIN (STAT)

## 2024-06-10 PROCEDURE — 94760 N-INVAS EAR/PLS OXIMETRY 1: CPT

## 2024-06-10 PROCEDURE — 63600175 PHARM REV CODE 636 W HCPCS: Performed by: INTERNAL MEDICINE

## 2024-06-10 RX ORDER — LEVETIRACETAM 1000 MG/1
1000 TABLET ORAL 2 TIMES DAILY
Qty: 60 TABLET | Refills: 11 | Status: SHIPPED | OUTPATIENT
Start: 2024-06-10 | End: 2025-06-10

## 2024-06-10 RX ORDER — PRAVASTATIN SODIUM 40 MG/1
40 TABLET ORAL NIGHTLY
Qty: 90 TABLET | Refills: 3 | Status: SHIPPED | OUTPATIENT
Start: 2024-06-10 | End: 2025-06-10

## 2024-06-10 RX ORDER — DILTIAZEM HYDROCHLORIDE 30 MG/1
30 TABLET, FILM COATED ORAL 2 TIMES DAILY
Qty: 60 TABLET | Refills: 11 | Status: SHIPPED | OUTPATIENT
Start: 2024-06-10 | End: 2025-06-10

## 2024-06-10 RX ORDER — NAPROXEN SODIUM 220 MG/1
81 TABLET, FILM COATED ORAL DAILY
Qty: 30 TABLET | Refills: 0 | Status: SHIPPED | OUTPATIENT
Start: 2024-06-10 | End: 2025-06-10

## 2024-06-10 RX ADMIN — DILTIAZEM HYDROCHLORIDE 30 MG: 30 TABLET, FILM COATED ORAL at 08:06

## 2024-06-10 RX ADMIN — LEVETIRACETAM INJECTION 1000 MG: 10 INJECTION INTRAVENOUS at 08:06

## 2024-06-10 RX ADMIN — ASPIRIN 81 MG CHEWABLE TABLET 81 MG: 81 TABLET CHEWABLE at 08:06

## 2024-06-10 NOTE — PLAN OF CARE
CM phoned [facility] to arrange transport of patient back to [facility] Patient has poor trunk control , unable to transport back per WC van

## 2024-06-10 NOTE — PLAN OF CARE
06/10/24 1259   Final Note   Assessment Type Final Discharge Note   Anticipated Discharge Disposition [facility]   Post-Acute Status   Post-Acute Authorization   (Return to [facility])   Discharge Delays None known at this time

## 2024-06-10 NOTE — DISCHARGE SUMMARY
[facility] Hospital Medicine  Discharge Summary      Patient Name: Bandar Medeiros  MRN: [id number]  Admission Date: 6/1/2024  Hospital Length of Stay: 8 days  Discharge Date and Time:  06/10/2024 6:37 AM  Attending Physician: Karol Eastman MD   Discharging Provider: Karol Eastman MD  Discharge Provider Team: Networked reference to record PCT   Primary Care Provider: Natasha, Primary Doctor        HPI:  Patient came from nursing home with new onset seizures.  * No surgery found *      Hospital Course:  The patient had new onset seizures.  Was given Keppra ID.  CT scan of the brain was normal.  Neurology was consulted.  Subsequent MRI did show a very large him forward ischemic to the left serum cerebral area.  He had improved throughout his stay becoming more alert and was able to swallow.  Still has not been able to speak.  However he continues to improve.  The patient will be discharged back to nursing home to continue with medications including Keppra, statins and aspirin.    Consults:   Consults (From admission, onward)          Status Ordering Provider     Inpatient consult to Neurology  Once        Provider:  Dane Ozuna MD    Completed KAROL EASTMAN     IP consult to case management/social work  Once        Provider:  (Not yet assigned)    Acknowledged KAROL EASTMAN     Inpatient consult to Cardiology  Once        Provider:  Geronimo Sepulveda Jr., MD    Completed JIMBO KAUFFMAN            Final Active Diagnoses:    Diagnosis Date Noted POA    PRINCIPAL PROBLEM:  Seizure [R56.9] 06/02/2024 Yes      Problems Resolved During this Admission:      Discharged Condition: stable    Disposition: Nursing Facility    Follow Up:    Patient Instructions:   No discharge procedures on file.  Medications:  Reconciled Home Medications:      Medication List        ASK your doctor about these medications      busPIRone 5 MG Tab  Commonly known as: BUSPAR  Take 5 mg by mouth 2 (two) times  daily.     carvediloL 12.5 MG tablet  Commonly known as: COREG  Take 12.5 mg by mouth 2 (two) times daily.     digoxin 250 mcg tablet  Commonly known as: LANOXIN  Take 250 mcg by mouth once daily.     donepeziL 10 MG tablet  Commonly known as: ARICEPT  Take 10 mg by mouth every evening.     ELIQUIS 5 mg Tab  Generic drug: apixaban  Take 5 mg by mouth 2 (two) times daily.     ENTRESTO 24-26 mg per tablet  Generic drug: sacubitriL-valsartan  Take 1 tablet by mouth 2 (two) times daily.     famotidine 20 MG tablet  Commonly known as: PEPCID  Take 20 mg by mouth 2 (two) times daily.     gabapentin 300 MG capsule  Commonly known as: NEURONTIN  Take 300 mg by mouth every evening.     memantine 10 MG Tab  Commonly known as: NAMENDA  Take 10 mg by mouth 2 (two) times daily.     RESTASIS 0.05 % ophthalmic emulsion  Generic drug: cycloSPORINE  Place 1 drop into both eyes 2 (two) times daily.              Significant Diagnostic Studies: Labs: BMP:   Recent Labs   Lab 06/09/24  0017      K 4.4      CO2 31   BUN 17.7   CREATININE 0.81   CALCIUM 8.6*   , CMP   Recent Labs   Lab 06/09/24  0017      K 4.4      CO2 31   BUN 17.7   CREATININE 0.81   CALCIUM 8.6*   , and CBC   Recent Labs   Lab 06/09/24  0017   WBC 8.66   HGB 11.8*   HCT 36.9*        Radiology: MRI:  Large left ischemic infarct    Pending Diagnostic Studies:       None          Indwelling Lines/Drains at time of discharge:   Lines/Drains/Airways       None                   Time spent on the discharge of patient: 20 minutes         Vasquez Eastman MD  Department of Hospital Medicine  [facility]

## 2024-06-10 NOTE — PLAN OF CARE
Problem: Adult Inpatient Plan of Care  Goal: Patient-Specific Goal (Individualized)  Outcome: Progressing  Goal: Absence of Hospital-Acquired Illness or Injury  Outcome: Progressing  Goal: Optimal Comfort and Wellbeing  Outcome: Progressing  Goal: Readiness for Transition of Care  Outcome: Met     Problem: Skin Injury Risk Increased  Goal: Skin Health and Integrity  Outcome: Progressing     Problem: Stroke, Ischemic (Includes Transient Ischemic Attack)  Goal: Improved Sensorimotor Function  Outcome: Progressing  Goal: Safe and Effective Swallow  Outcome: Progressing

## 2024-06-10 NOTE — PLAN OF CARE
Discharge information sent to [facility] via [employer]. Awaiting response on who to call report to.    Reached out to Opal, admissions coordinator, at [facility] to make sure they received the discharge information. Was put on hold and never connected. Will try again shortly.

## 2024-06-10 NOTE — NURSING
Patient to be discharged back to [facility]. Report was called and given to WEN Cruz. Patients admitting diagonals as well as interventions was discussed with nurse. No further questions were asked asa the patient was already a resident at the facility. Iv was removed without complications. Patient awaiting [facility] for transport to facility. Patient stable upon discharge.

## 2024-06-11 LAB
LEFT CCA DIST DIAS: 0 CM/S
LEFT CCA DIST SYS: 42 CM/S
LEFT CCA PROX DIAS: 7 CM/S
LEFT CCA PROX SYS: 71 CM/S
LEFT ECA DIAS: 0 CM/S
LEFT ECA SYS: 72 CM/S
LEFT ICA DIST DIAS: 13 CM/S
LEFT ICA DIST SYS: 60 CM/S
LEFT ICA MID DIAS: 22 CM/S
LEFT ICA MID SYS: 76 CM/S
LEFT ICA PROX DIAS: 3 CM/S
LEFT ICA PROX SYS: 31 CM/S
LEFT VERTEBRAL SYS: 69 CM/S
OHS CV CAROTID RIGHT ICA EDV HIGHEST: 7
OHS CV CAROTID ULTRASOUND LEFT ICA/CCA RATIO: 1.81
OHS CV CAROTID ULTRASOUND RIGHT ICA/CCA RATIO: 2.45
OHS CV PV CAROTID LEFT HIGHEST CCA: 71
OHS CV PV CAROTID LEFT HIGHEST ICA: 76
OHS CV PV CAROTID RIGHT HIGHEST CCA: 74
OHS CV PV CAROTID RIGHT HIGHEST ICA: 115
OHS CV US CAROTID LEFT HIGHEST EDV: 22
RIGHT CCA DIST DIAS: 2 CM/S
RIGHT CCA DIST SYS: 47 CM/S
RIGHT CCA PROX DIAS: 0 CM/S
RIGHT CCA PROX SYS: 74 CM/S
RIGHT ECA DIAS: 1 CM/S
RIGHT ECA SYS: 91 CM/S
RIGHT ICA DIST DIAS: 7 CM/S
RIGHT ICA DIST SYS: 73 CM/S
RIGHT ICA MID DIAS: 6 CM/S
RIGHT ICA MID SYS: 98 CM/S
RIGHT ICA PROX DIAS: 3 CM/S
RIGHT ICA PROX SYS: 115 CM/S
RIGHT VERTEBRAL DIAS: 0 CM/S
RIGHT VERTEBRAL SYS: 28 CM/S

## 2024-06-12 NOTE — PHYSICIAN QUERY
Question: Please clarify the Cardiac diagnosis in relation to the documentation listed in the query    Provider Query Response:

## 2024-06-14 ENCOUNTER — LAB REQUISITION (OUTPATIENT)
Dept: LAB | Facility: HOSPITAL | Age: 84
End: 2024-06-14
Payer: COMMERCIAL

## 2024-06-14 DIAGNOSIS — G40.89 OTHER SEIZURES: ICD-10-CM

## 2024-06-14 PROCEDURE — 80177 DRUG SCRN QUAN LEVETIRACETAM: CPT | Performed by: FAMILY MEDICINE

## 2024-06-16 LAB — LEVETIRACETAM SERPL-MCNC: 38.5 MCG/ML (ref 10–40)

## 2024-06-17 NOTE — PHYSICIAN QUERY
Question: Please clarify the cardiac diagnosis in relation to the documentation listed in the query:    Provider Query Response:

## 2024-06-24 ENCOUNTER — LAB REQUISITION (OUTPATIENT)
Dept: LAB | Facility: HOSPITAL | Age: 84
End: 2024-06-24
Payer: COMMERCIAL

## 2024-06-24 DIAGNOSIS — R79.9 ABNORMAL FINDING OF BLOOD CHEMISTRY, UNSPECIFIED: ICD-10-CM

## 2024-06-24 LAB
ERYTHROCYTE [DISTWIDTH] IN BLOOD BY AUTOMATED COUNT: 13.5 % (ref 11.5–17)
HCT VFR BLD AUTO: 36.9 % (ref 42–52)
HGB BLD-MCNC: 12 G/DL (ref 14–18)
MCH RBC QN AUTO: 30.6 PG (ref 27–31)
MCHC RBC AUTO-ENTMCNC: 32.5 G/DL (ref 33–36)
MCV RBC AUTO: 94.1 FL (ref 80–94)
NRBC BLD AUTO-RTO: 0 %
PLATELET # BLD AUTO: 448 X10(3)/MCL (ref 130–400)
PMV BLD AUTO: 10.3 FL (ref 7.4–10.4)
RBC # BLD AUTO: 3.92 X10(6)/MCL (ref 4.7–6.1)
WBC # BLD AUTO: 15.43 X10(3)/MCL (ref 4.5–11.5)

## 2024-06-24 PROCEDURE — 85027 COMPLETE CBC AUTOMATED: CPT | Performed by: FAMILY MEDICINE
